# Patient Record
Sex: MALE | Race: WHITE | NOT HISPANIC OR LATINO | Employment: OTHER | URBAN - METROPOLITAN AREA
[De-identification: names, ages, dates, MRNs, and addresses within clinical notes are randomized per-mention and may not be internally consistent; named-entity substitution may affect disease eponyms.]

---

## 2021-12-15 PROBLEM — I48.91 ATRIAL FIBRILLATION WITH RAPID VENTRICULAR RESPONSE: Status: ACTIVE | Noted: 2021-12-15

## 2021-12-15 PROBLEM — R93.89 ABNORMAL CT OF THE CHEST: Status: ACTIVE | Noted: 2021-12-15

## 2021-12-15 PROBLEM — R79.89 ELEVATED TROPONIN: Status: ACTIVE | Noted: 2021-12-15

## 2021-12-15 PROBLEM — N19 RENAL FAILURE: Status: ACTIVE | Noted: 2021-12-15

## 2021-12-15 PROBLEM — Z71.89 ADVANCE CARE PLANNING: Status: ACTIVE | Noted: 2021-12-15

## 2021-12-15 PROBLEM — A41.9 SEVERE SEPSIS: Status: ACTIVE | Noted: 2021-12-15

## 2021-12-15 PROBLEM — R65.20 SEVERE SEPSIS: Status: ACTIVE | Noted: 2021-12-15

## 2021-12-15 PROBLEM — J96.01 ACUTE RESPIRATORY FAILURE WITH HYPOXIA: Status: ACTIVE | Noted: 2021-12-15

## 2021-12-15 PROBLEM — Z72.0 TOBACCO ABUSE: Status: ACTIVE | Noted: 2021-12-15

## 2021-12-15 PROBLEM — J98.8 RESPIRATORY INFECTION: Status: ACTIVE | Noted: 2021-12-15

## 2021-12-16 PROBLEM — R74.8 ELEVATED CK: Status: ACTIVE | Noted: 2021-12-16

## 2021-12-16 PROBLEM — Z91.199 MEDICAL NON-COMPLIANCE: Status: ACTIVE | Noted: 2021-12-16

## 2021-12-16 PROBLEM — E88.09 HYPOALBUMINEMIA: Status: ACTIVE | Noted: 2021-12-16

## 2021-12-18 PROBLEM — N17.9 AKI (ACUTE KIDNEY INJURY): Status: ACTIVE | Noted: 2021-12-15

## 2022-01-01 ENCOUNTER — CLINICAL SUPPORT (OUTPATIENT)
Dept: CARDIOLOGY | Facility: HOSPITAL | Age: 67
End: 2022-01-01
Attending: INTERNAL MEDICINE
Payer: MEDICARE

## 2022-01-01 ENCOUNTER — LAB VISIT (OUTPATIENT)
Dept: LAB | Facility: HOSPITAL | Age: 67
End: 2022-01-01
Attending: PHYSICIAN ASSISTANT
Payer: MEDICARE

## 2022-01-01 ENCOUNTER — PATIENT MESSAGE (OUTPATIENT)
Dept: CARDIOLOGY | Facility: CLINIC | Age: 67
End: 2022-01-01
Payer: MEDICARE

## 2022-01-01 ENCOUNTER — OFFICE VISIT (OUTPATIENT)
Dept: CARDIOLOGY | Facility: CLINIC | Age: 67
End: 2022-01-01
Payer: MEDICARE

## 2022-01-01 ENCOUNTER — HOSPITAL ENCOUNTER (OUTPATIENT)
Dept: RADIOLOGY | Facility: HOSPITAL | Age: 67
Discharge: HOME | End: 2022-11-01
Attending: NURSE PRACTITIONER
Payer: MEDICARE

## 2022-01-01 ENCOUNTER — OFFICE VISIT (OUTPATIENT)
Dept: HEPATOLOGY | Facility: CLINIC | Age: 67
End: 2022-01-01
Payer: MEDICARE

## 2022-01-01 ENCOUNTER — SPECIALTY PHARMACY (OUTPATIENT)
Dept: PHARMACY | Facility: CLINIC | Age: 67
End: 2022-01-01
Payer: MEDICAID

## 2022-01-01 ENCOUNTER — SPECIALTY PHARMACY (OUTPATIENT)
Dept: PHARMACY | Facility: CLINIC | Age: 67
End: 2022-01-01
Payer: MEDICARE

## 2022-01-01 ENCOUNTER — TELEPHONE (OUTPATIENT)
Dept: CARDIOLOGY | Facility: CLINIC | Age: 67
End: 2022-01-01
Payer: MEDICARE

## 2022-01-01 ENCOUNTER — LAB VISIT (OUTPATIENT)
Dept: LAB | Facility: HOSPITAL | Age: 67
End: 2022-01-01
Attending: NURSE PRACTITIONER
Payer: MEDICARE

## 2022-01-01 ENCOUNTER — OFFICE VISIT (OUTPATIENT)
Dept: ORTHOPEDICS | Facility: CLINIC | Age: 67
End: 2022-01-01
Payer: MEDICARE

## 2022-01-01 ENCOUNTER — TELEPHONE (OUTPATIENT)
Dept: HEPATOLOGY | Facility: CLINIC | Age: 67
End: 2022-01-01
Payer: MEDICARE

## 2022-01-01 VITALS
SYSTOLIC BLOOD PRESSURE: 111 MMHG | BODY MASS INDEX: 18.26 KG/M2 | WEIGHT: 109.56 LBS | DIASTOLIC BLOOD PRESSURE: 76 MMHG | HEART RATE: 120 BPM | HEIGHT: 65 IN | RESPIRATION RATE: 17 BRPM

## 2022-01-01 VITALS
BODY MASS INDEX: 19.98 KG/M2 | WEIGHT: 119.94 LBS | DIASTOLIC BLOOD PRESSURE: 84 MMHG | SYSTOLIC BLOOD PRESSURE: 156 MMHG | HEIGHT: 65 IN | HEART RATE: 111 BPM

## 2022-01-01 VITALS — HEIGHT: 65 IN | BODY MASS INDEX: 19.98 KG/M2 | WEIGHT: 119.94 LBS

## 2022-01-01 DIAGNOSIS — B18.2 CHRONIC HEPATITIS C WITHOUT HEPATIC COMA: ICD-10-CM

## 2022-01-01 DIAGNOSIS — R79.89 ELEVATED BRAIN NATRIURETIC PEPTIDE (BNP) LEVEL: ICD-10-CM

## 2022-01-01 DIAGNOSIS — J84.10 PULMONARY FIBROSIS: ICD-10-CM

## 2022-01-01 DIAGNOSIS — M25.512 LEFT SHOULDER PAIN: ICD-10-CM

## 2022-01-01 DIAGNOSIS — Z86.19 HEPATITIS C VIRUS INFECTION CURED AFTER ANTIVIRAL DRUG THERAPY: Primary | ICD-10-CM

## 2022-01-01 DIAGNOSIS — B18.2 CHRONIC HEPATITIS C WITHOUT HEPATIC COMA: Primary | ICD-10-CM

## 2022-01-01 DIAGNOSIS — M25.511 BILATERAL SHOULDER PAIN, UNSPECIFIED CHRONICITY: ICD-10-CM

## 2022-01-01 DIAGNOSIS — M25.511 BILATERAL SHOULDER PAIN, UNSPECIFIED CHRONICITY: Primary | ICD-10-CM

## 2022-01-01 DIAGNOSIS — M25.512 BILATERAL SHOULDER PAIN, UNSPECIFIED CHRONICITY: Primary | ICD-10-CM

## 2022-01-01 DIAGNOSIS — I48.91 ATRIAL FIBRILLATION WITH RAPID VENTRICULAR RESPONSE: ICD-10-CM

## 2022-01-01 DIAGNOSIS — R74.8 ELEVATED ALKALINE PHOSPHATASE LEVEL: ICD-10-CM

## 2022-01-01 DIAGNOSIS — R93.89 ABNORMAL CT OF THE CHEST: ICD-10-CM

## 2022-01-01 DIAGNOSIS — R79.89 ELEVATED TROPONIN: ICD-10-CM

## 2022-01-01 DIAGNOSIS — M25.512 BILATERAL SHOULDER PAIN, UNSPECIFIED CHRONICITY: ICD-10-CM

## 2022-01-01 DIAGNOSIS — Z86.19 HEPATITIS C VIRUS INFECTION CURED AFTER ANTIVIRAL DRUG THERAPY: ICD-10-CM

## 2022-01-01 DIAGNOSIS — I48.91 ATRIAL FIBRILLATION WITH RAPID VENTRICULAR RESPONSE: Primary | ICD-10-CM

## 2022-01-01 DIAGNOSIS — M75.42 IMPINGEMENT SYNDROME OF LEFT SHOULDER: Primary | ICD-10-CM

## 2022-01-01 DIAGNOSIS — R74.8 ELEVATED ALKALINE PHOSPHATASE LEVEL: Primary | ICD-10-CM

## 2022-01-01 LAB
ACE SERPL-CCNC: 19 U/L (ref 16–85)
ALBUMIN SERPL BCP-MCNC: 2.8 G/DL (ref 3.5–5.2)
ALBUMIN SERPL BCP-MCNC: 2.9 G/DL (ref 3.5–5.2)
ALP BONE CFR SERPL: 43 % (ref 28–66)
ALP INTEST CFR SERPL: 0 % (ref 1–24)
ALP ISOS SERPL-IMP: ABNORMAL
ALP LIVER CFR SERPL: 57 % (ref 25–69)
ALP MACROHEPATIC CFR SERPL: ABNORMAL %
ALP PLAC CFR SERPL: 0 %
ALP SERPL-CCNC: 224 U/L (ref 35–144)
ALP SERPL-CCNC: 247 U/L (ref 55–135)
ALP SERPL-CCNC: 330 U/L (ref 55–135)
ALT SERPL W/O P-5'-P-CCNC: 16 U/L (ref 10–44)
ALT SERPL W/O P-5'-P-CCNC: 22 U/L (ref 10–44)
AST SERPL-CCNC: 26 U/L (ref 10–40)
AST SERPL-CCNC: 30 U/L (ref 10–40)
BILIRUB DIRECT SERPL-MCNC: 0.1 MG/DL (ref 0.1–0.3)
BILIRUB DIRECT SERPL-MCNC: 0.2 MG/DL (ref 0.1–0.3)
BILIRUB SERPL-MCNC: 0.3 MG/DL (ref 0.1–1)
BILIRUB SERPL-MCNC: 0.5 MG/DL (ref 0.1–1)
FERRITIN SERPL-MCNC: 599 NG/ML (ref 20–300)
GGT SERPL-CCNC: 264 U/L (ref 8–55)
HCV RNA SERPL QL NAA+PROBE: NOT DETECTED
HCV RNA SPEC NAA+PROBE-ACNC: <12 IU/ML
IGM SERPL-MCNC: 147 MG/DL (ref 50–300)
IRON SERPL-MCNC: 29 UG/DL (ref 45–160)
MITOCHONDRIA AB TITR SER IF: NORMAL {TITER}
OHS CV EVENT MONITOR DAY: 0
OHS CV HOLTER LENGTH DECIMAL HOURS: 48
OHS CV HOLTER LENGTH HOURS: 48
OHS CV HOLTER LENGTH MINUTES: 0
OHS CV HOLTER SINUS AVERAGE HR: 81
OHS CV HOLTER SINUS MAX HR: 145
OHS CV HOLTER SINUS MIN HR: 50
PROT SERPL-MCNC: 7.8 G/DL (ref 6–8.4)
PROT SERPL-MCNC: 7.8 G/DL (ref 6–8.4)
SATURATED IRON: 13 % (ref 20–50)
TOTAL IRON BINDING CAPACITY: 228 UG/DL (ref 250–450)
TRANSFERRIN SERPL-MCNC: 154 MG/DL (ref 200–375)

## 2022-01-01 PROCEDURE — 93227 XTRNL ECG REC<48 HR R&I: CPT | Mod: ,,, | Performed by: INTERNAL MEDICINE

## 2022-01-01 PROCEDURE — 99214 PR OFFICE/OUTPT VISIT, EST, LEVL IV, 30-39 MIN: ICD-10-PCS | Mod: S$PBB,,, | Performed by: INTERNAL MEDICINE

## 2022-01-01 PROCEDURE — 73030 X-RAY EXAM OF SHOULDER: CPT | Mod: 26,LT,, | Performed by: RADIOLOGY

## 2022-01-01 PROCEDURE — 99203 OFFICE O/P NEW LOW 30 MIN: CPT | Mod: S$PBB,25,, | Performed by: NURSE PRACTITIONER

## 2022-01-01 PROCEDURE — 82164 ANGIOTENSIN I ENZYME TEST: CPT | Performed by: NURSE PRACTITIONER

## 2022-01-01 PROCEDURE — 20610 DRAIN/INJ JOINT/BURSA W/O US: CPT | Mod: S$PBB,LT,, | Performed by: NURSE PRACTITIONER

## 2022-01-01 PROCEDURE — 93226 XTRNL ECG REC<48 HR SCAN A/R: CPT | Mod: PO

## 2022-01-01 PROCEDURE — 36415 COLL VENOUS BLD VENIPUNCTURE: CPT | Mod: PO | Performed by: NURSE PRACTITIONER

## 2022-01-01 PROCEDURE — 99999 PR PBB SHADOW E&M-EST. PATIENT-LVL IV: CPT | Mod: PBBFAC,,, | Performed by: NURSE PRACTITIONER

## 2022-01-01 PROCEDURE — 99999 PR PBB SHADOW E&M-EST. PATIENT-LVL III: CPT | Mod: PBBFAC,,, | Performed by: NURSE PRACTITIONER

## 2022-01-01 PROCEDURE — 20610 DRAIN/INJ JOINT/BURSA W/O US: CPT | Mod: PBBFAC,PN | Performed by: NURSE PRACTITIONER

## 2022-01-01 PROCEDURE — 99214 OFFICE O/P EST MOD 30 MIN: CPT | Mod: PBBFAC,PO | Performed by: NURSE PRACTITIONER

## 2022-01-01 PROCEDURE — 80076 HEPATIC FUNCTION PANEL: CPT | Performed by: PHYSICIAN ASSISTANT

## 2022-01-01 PROCEDURE — 36415 COLL VENOUS BLD VENIPUNCTURE: CPT | Mod: PN | Performed by: PHYSICIAN ASSISTANT

## 2022-01-01 PROCEDURE — 99999 PR PBB SHADOW E&M-EST. PATIENT-LVL III: ICD-10-PCS | Mod: PBBFAC,,, | Performed by: NURSE PRACTITIONER

## 2022-01-01 PROCEDURE — 99214 PR OFFICE/OUTPT VISIT, EST, LEVL IV, 30-39 MIN: ICD-10-PCS | Mod: S$PBB,,, | Performed by: NURSE PRACTITIONER

## 2022-01-01 PROCEDURE — 80076 HEPATIC FUNCTION PANEL: CPT | Performed by: NURSE PRACTITIONER

## 2022-01-01 PROCEDURE — 99214 OFFICE O/P EST MOD 30 MIN: CPT | Mod: S$PBB,,, | Performed by: INTERNAL MEDICINE

## 2022-01-01 PROCEDURE — 93227 HOLTER MONITOR - 48 HOUR (CUPID ONLY): ICD-10-PCS | Mod: ,,, | Performed by: INTERNAL MEDICINE

## 2022-01-01 PROCEDURE — 99213 OFFICE O/P EST LOW 20 MIN: CPT | Mod: PBBFAC,PN | Performed by: NURSE PRACTITIONER

## 2022-01-01 PROCEDURE — 99203 PR OFFICE/OUTPT VISIT, NEW, LEVL III, 30-44 MIN: ICD-10-PCS | Mod: S$PBB,25,, | Performed by: NURSE PRACTITIONER

## 2022-01-01 PROCEDURE — 84466 ASSAY OF TRANSFERRIN: CPT | Performed by: NURSE PRACTITIONER

## 2022-01-01 PROCEDURE — 99999 PR PBB SHADOW E&M-EST. PATIENT-LVL IV: CPT | Mod: PBBFAC,,, | Performed by: INTERNAL MEDICINE

## 2022-01-01 PROCEDURE — 86381 MITOCHONDRIAL ANTIBODY EACH: CPT | Performed by: NURSE PRACTITIONER

## 2022-01-01 PROCEDURE — 99214 OFFICE O/P EST MOD 30 MIN: CPT | Mod: S$PBB,,, | Performed by: NURSE PRACTITIONER

## 2022-01-01 PROCEDURE — 82784 ASSAY IGA/IGD/IGG/IGM EACH: CPT | Performed by: NURSE PRACTITIONER

## 2022-01-01 PROCEDURE — 82977 ASSAY OF GGT: CPT | Performed by: NURSE PRACTITIONER

## 2022-01-01 PROCEDURE — 84075 ASSAY ALKALINE PHOSPHATASE: CPT | Mod: 91 | Performed by: NURSE PRACTITIONER

## 2022-01-01 PROCEDURE — 87522 HEPATITIS C REVRS TRNSCRPJ: CPT | Performed by: PHYSICIAN ASSISTANT

## 2022-01-01 PROCEDURE — 82728 ASSAY OF FERRITIN: CPT | Performed by: NURSE PRACTITIONER

## 2022-01-01 PROCEDURE — 99999 PR PBB SHADOW E&M-EST. PATIENT-LVL IV: ICD-10-PCS | Mod: PBBFAC,,, | Performed by: INTERNAL MEDICINE

## 2022-01-01 PROCEDURE — 20610 LARGE JOINT ASPIRATION/INJECTION: L SUBACROMIAL BURSA: ICD-10-PCS | Mod: S$PBB,LT,, | Performed by: NURSE PRACTITIONER

## 2022-01-01 PROCEDURE — 73030 X-RAY EXAM OF SHOULDER: CPT | Mod: TC,PO,LT

## 2022-01-01 PROCEDURE — 99999 PR PBB SHADOW E&M-EST. PATIENT-LVL IV: ICD-10-PCS | Mod: PBBFAC,,, | Performed by: NURSE PRACTITIONER

## 2022-01-01 PROCEDURE — 73030 XR SHOULDER TRAUMA 3 VIEW LEFT: ICD-10-PCS | Mod: 26,LT,, | Performed by: RADIOLOGY

## 2022-01-01 PROCEDURE — 99214 OFFICE O/P EST MOD 30 MIN: CPT | Mod: PBBFAC,PO | Performed by: INTERNAL MEDICINE

## 2022-01-01 RX ORDER — KETOCONAZOLE 20 MG/ML
SHAMPOO, SUSPENSION TOPICAL
COMMUNITY
Start: 2022-01-01

## 2022-01-01 RX ORDER — FINASTERIDE 1 MG/1
TABLET, FILM COATED ORAL
COMMUNITY
End: 2022-01-01

## 2022-01-01 RX ORDER — MEGESTROL ACETATE 125 MG/ML
5 SUSPENSION ORAL
Status: ON HOLD | COMMUNITY
End: 2023-01-01 | Stop reason: CLARIF

## 2022-01-01 RX ORDER — MOXIFLOXACIN 5 MG/ML
SOLUTION/ DROPS OPHTHALMIC
COMMUNITY
Start: 2022-05-12 | End: 2022-01-01

## 2022-01-01 RX ORDER — CLOTRIMAZOLE AND BETAMETHASONE DIPROPIONATE 10; .64 MG/G; MG/G
CREAM TOPICAL
COMMUNITY
Start: 2022-04-11

## 2022-01-01 RX ORDER — SERTRALINE HYDROCHLORIDE 25 MG/1
25 TABLET, FILM COATED ORAL DAILY
Status: ON HOLD | COMMUNITY
Start: 2022-01-01 | End: 2023-01-01 | Stop reason: CLARIF

## 2022-01-01 RX ORDER — AMMONIUM LACTATE 12 G/100G
1 CREAM TOPICAL 3 TIMES DAILY
COMMUNITY
Start: 2022-01-01 | End: 2023-01-01

## 2022-01-01 RX ORDER — PREDNISOLONE ACETATE 10 MG/ML
1 SUSPENSION/ DROPS OPHTHALMIC 4 TIMES DAILY
COMMUNITY
Start: 2022-06-02 | End: 2022-01-01

## 2022-01-01 RX ORDER — LEVOFLOXACIN 500 MG/1
TABLET, FILM COATED ORAL
Status: ON HOLD | COMMUNITY
End: 2023-01-01 | Stop reason: CLARIF

## 2022-01-01 RX ORDER — TRIAMCINOLONE ACETONIDE 40 MG/ML
40 INJECTION, SUSPENSION INTRA-ARTICULAR; INTRAMUSCULAR
Status: DISCONTINUED | OUTPATIENT
Start: 2022-01-01 | End: 2022-01-01 | Stop reason: HOSPADM

## 2022-01-01 RX ORDER — CLOBETASOL PROPIONATE 0.46 MG/ML
SOLUTION TOPICAL
COMMUNITY
Start: 2022-04-11

## 2022-01-01 RX ORDER — TRIAMCINOLONE ACETONIDE 1 MG/G
1 OINTMENT TOPICAL 2 TIMES DAILY
COMMUNITY
Start: 2022-04-11

## 2022-01-01 RX ORDER — TIOTROPIUM BROMIDE AND OLODATEROL 3.124; 2.736 UG/1; UG/1
1 SPRAY, METERED RESPIRATORY (INHALATION) DAILY
COMMUNITY
Start: 2022-05-09 | End: 2022-01-01

## 2022-01-01 RX ORDER — CLOBETASOL PROPIONATE 0.46 MG/ML
SOLUTION TOPICAL
COMMUNITY

## 2022-01-01 RX ORDER — BROMFENAC SODIUM 0.7 MG/ML
1 SOLUTION/ DROPS OPHTHALMIC DAILY
COMMUNITY
Start: 2022-06-03 | End: 2022-01-01

## 2022-01-01 RX ADMIN — TRIAMCINOLONE ACETONIDE 40 MG: 40 INJECTION, SUSPENSION INTRA-ARTICULAR; INTRAMUSCULAR at 03:11

## 2022-02-07 ENCOUNTER — OFFICE VISIT (OUTPATIENT)
Dept: CARDIOLOGY | Facility: CLINIC | Age: 67
End: 2022-02-07
Payer: MEDICARE

## 2022-02-07 VITALS
BODY MASS INDEX: 18.2 KG/M2 | DIASTOLIC BLOOD PRESSURE: 93 MMHG | WEIGHT: 109.38 LBS | HEART RATE: 94 BPM | SYSTOLIC BLOOD PRESSURE: 138 MMHG

## 2022-02-07 DIAGNOSIS — J84.10 PULMONARY FIBROSIS: Primary | ICD-10-CM

## 2022-02-07 DIAGNOSIS — R79.89 ELEVATED TROPONIN: ICD-10-CM

## 2022-02-07 DIAGNOSIS — R06.02 SHORTNESS OF BREATH: ICD-10-CM

## 2022-02-07 DIAGNOSIS — N17.9 AKI (ACUTE KIDNEY INJURY): ICD-10-CM

## 2022-02-07 DIAGNOSIS — I48.91 ATRIAL FIBRILLATION WITH RAPID VENTRICULAR RESPONSE: ICD-10-CM

## 2022-02-07 DIAGNOSIS — R65.20 SEVERE SEPSIS: ICD-10-CM

## 2022-02-07 DIAGNOSIS — Z71.89 ADVANCE CARE PLANNING: ICD-10-CM

## 2022-02-07 DIAGNOSIS — R06.03 RESPIRATORY DISTRESS: ICD-10-CM

## 2022-02-07 DIAGNOSIS — A41.9 SEVERE SEPSIS: ICD-10-CM

## 2022-02-07 PROCEDURE — 99214 PR OFFICE/OUTPT VISIT, EST, LEVL IV, 30-39 MIN: ICD-10-PCS | Mod: S$PBB,,, | Performed by: INTERNAL MEDICINE

## 2022-02-07 PROCEDURE — 99999 PR PBB SHADOW E&M-EST. PATIENT-LVL III: CPT | Mod: PBBFAC,,, | Performed by: INTERNAL MEDICINE

## 2022-02-07 PROCEDURE — 99999 PR PBB SHADOW E&M-EST. PATIENT-LVL III: ICD-10-PCS | Mod: PBBFAC,,, | Performed by: INTERNAL MEDICINE

## 2022-02-07 PROCEDURE — 99213 OFFICE O/P EST LOW 20 MIN: CPT | Mod: PBBFAC,PO | Performed by: INTERNAL MEDICINE

## 2022-02-07 PROCEDURE — 99214 OFFICE O/P EST MOD 30 MIN: CPT | Mod: S$PBB,,, | Performed by: INTERNAL MEDICINE

## 2022-02-07 NOTE — PROGRESS NOTES
Subjective:    Patient ID:  Magdy Austin is a 66 y.o. male patient here for evaluation Establish Care, Shortness of Breath, and Atrial Fibrillation      History of Present Illness:  Hospital follow-up.  History of pulmonary fibrosis, hospitalization for syncope and probable septic syndrome with underlying pneumonia.  Patient recovered, back baseline.  Other problems encountered during the hospitalization was paroxysmal atrial fibrillation which apparently responded well to calcium channel blocker therapy.  Patient is now on apixaban 5 p.o. b.i.d..    Patient's quit tobacco products since hospitalization.       Workup during the hospitalization included nuclear stress test, echo.  Ejection fraction in the 45%, nuclear study negative for ischemia.  Significant valvular heart issues.            Review of patient's allergies indicates:  No Known Allergies    Past Medical History:   Diagnosis Date    Dysrhythmia     Medical non-compliance      No past surgical history on file.  Social History     Tobacco Use    Smoking status: Former Smoker     Packs/day: 1.50     Years: 30.00     Pack years: 45.00     Types: Cigarettes     Quit date: 2021     Years since quittin.1    Smokeless tobacco: Never Used    Tobacco comment: used to smoke 2 ppd now down to 1 ppd   Substance Use Topics    Alcohol use: Yes     Comment: once a week    Drug use: Never        Review of Systems:    As noted in HPI in addition      REVIEW OF SYSTEMS  Review of Systems   Constitutional: Negative for decreased appetite, diaphoresis, night sweats, weight gain and weight loss.   HENT: Negative for nosebleeds and odynophagia.    Eyes: Negative for double vision and photophobia.   Cardiovascular: Negative for chest pain, claudication, cyanosis, dyspnea on exertion, irregular heartbeat, leg swelling, near-syncope, orthopnea, palpitations, paroxysmal nocturnal dyspnea and syncope.   Respiratory: Negative for cough, hemoptysis,  shortness of breath and wheezing.    Hematologic/Lymphatic: Negative for adenopathy.   Skin: Negative for flushing, skin cancer and suspicious lesions.   Musculoskeletal: Negative for gout, myalgias and neck pain.   Gastrointestinal: Negative for abdominal pain, heartburn, hematemesis and hematochezia.   Genitourinary: Negative for bladder incontinence, hesitancy and nocturia.   Neurological: Negative for focal weakness, headaches, light-headedness and paresthesias.   Psychiatric/Behavioral: Negative for memory loss and substance abuse.              Objective:        Vitals:    02/07/22 0937   BP: (!) 138/93   Pulse: 94       Lab Results   Component Value Date    WBC 13.55 (H) 12/21/2021    HGB 12.2 (L) 12/21/2021    HCT 37.9 (L) 12/21/2021     12/21/2021    CHOL 71 (L) 12/16/2021    TRIG 115 12/16/2021    HDL 14 (L) 12/16/2021    ALT 50 12/20/2021    AST 71 (H) 12/20/2021     12/20/2021    K 4.9 12/20/2021     12/20/2021    CREATININE 0.66 12/20/2021    BUN 24 (H) 12/20/2021    CO2 34 (H) 12/20/2021    TSH 0.654 12/16/2021    INR 1.2 12/15/2021    HGBA1C 5.5 12/16/2021        ECHOCARDIOGRAM RESULTS  Results for orders placed during the hospital encounter of 12/15/21    Echo    Interpretation Summary  · Mild left atrial enlargement.  · The left ventricle is normal in size with mildly decreased systolic function.  · Grade I left ventricular diastolic dysfunction.  · The estimated PA systolic pressure is 14 mmHg.  · Normal central venous pressure (3 mmHg).  · The estimated ejection fraction is 45%.  · There is abnormal septal wall motion.  · Mild right atrial enlargement.  · Mild mitral regurgitation.  · Mild tricuspid regurgitation.        CURRENT/PREVIOUS VISIT EKG  Results for orders placed or performed during the hospital encounter of 12/15/21   EKG 12-lead    Collection Time: 12/15/21  8:35 PM    Narrative    Test Reason : I48.91,    Vent. Rate : 094 BPM     Atrial Rate : 094 BPM     P-R Int :  136 ms          QRS Dur : 084 ms      QT Int : 386 ms       P-R-T Axes : 071 -09 044 degrees     QTc Int : 482 ms    Normal sinus rhythm with sinus arrhythmia  Anterior infarct (cited on or before 15-DEC-2021)  Abnormal ECG  When compared with ECG of 15-DEC-2021 17:29,  Sinus rhythm has replaced Atrial fibrillation  Vent. rate has decreased BY  91 BPM  Questionable change in initial forces of Septal leads  Confirmed by Jaren Finn MD ([id number]) on 12/16/2021 9:55:38 AM    Referred By: CITLALY   SELF           Confirmed By:Jaren Finn MD     No valid procedures specified.   Results for orders placed during the hospital encounter of 12/15/21    Nuclear Stress - Cardiology Interpreted    Interpretation Summary    Normal myocardial perfusion scan. There is no evidence of myocardial ischemia or infarction.    There is trivial apical thinning which is a normal variant.    The gated perfusion images showed an ejection fraction of 44% at rest.    The EKG portion of this study is negative for ischemia.    The patient reported no chest pain during the stress test.    During stress, occasional PVCs are noted.    No valid procedures specified.    PHYSICAL EXAM  CONSTITUTIONAL: Well built, well nourished in no apparent distress  NECK: no carotid bruit, no JVD  LUNGS: CTA  CHEST WALL: no tenderness,  HEART: regular rate and rhythm, S1, S2 normal, no murmur, click, rub or gallop   ABDOMEN: soft, non-tender; bowel sounds normal; no masses,  no organomegaly  EXTREMITIES: Extremities normal, no edema, no calf tenderness noted  NEURO: AAO X 3    I HAVE REVIEWED :    The vital signs, nurses notes, and all the pertinent radiology and labs.         Current Outpatient Medications   Medication Instructions    albuterol (PROVENTIL) 2.5 mg, Nebulization, Every 6 hours PRN, Rescue    albuterol (PROVENTIL/VENTOLIN HFA) 90 mcg/actuation inhaler 2 puffs, Inhalation, Every 6 hours PRN, Rescue    apixaban (ELIQUIS) 5 mg, Oral, 2 times  daily    aspirin (ECOTRIN) 81 mg, Oral, Daily    diltiaZEM (CARDIZEM CD) 120 mg, Oral, Daily    fluticasone propionate (FLOVENT HFA) 44 mcg/actuation inhaler 1 puff, Inhalation, 2 times daily, Controller    hydrALAZINE (APRESOLINE) 25 mg, Oral, 3 times daily    levalbuterol (XOPENEX) 0.63 mg, Nebulization, Every 6 hours PRN, Rescue    nicotine (NICODERM CQ) 21 mg/24 hr 1 patch, Transdermal, Daily    ondansetron (ZOFRAN-ODT) 4 mg, Oral, Every 8 hours PRN    predniSONE (DELTASONE) 20 mg, Oral, Daily    tiotropium-olodateroL (STIOLTO RESPIMAT) 2.5-2.5 mcg/actuation Mist 1 puff, Inhalation, Daily, Controller    TRELEGY ELLIPTA 100-62.5-25 mcg DsDv 1 puff, Oral, Daily          Assessment:   Hospital follow-up.  Pulmonary fibrosis pneumonia sepsis.  Paroxysmal atrial fibrillation  History of tobacco use, chronic lung issue, has since stopped tobacco products.  Echo EF 45% with no significant valvular heart issues, nuclear study negative for ischemia.        Plan:   Continue Eliquis, diltiazem.  Cardiac hemodynamics otherwise stable.    Follow-up 3 months.          No follow-ups on file.

## 2022-02-22 ENCOUNTER — TELEPHONE (OUTPATIENT)
Dept: HEPATOLOGY | Facility: CLINIC | Age: 67
End: 2022-02-22
Payer: MEDICAID

## 2022-02-22 NOTE — TELEPHONE ENCOUNTER
Alvina Ortiz DNP ordered that patient be scheduled for a hep c consult visit.  Clinicals given to PA Scheuermann for review.   I spoke with patient and his sister.  Patient quant positive.  Virtual visit with PA Scheuermann scheduled 2/28/22; appt reminder notice mailed.

## 2022-02-28 ENCOUNTER — TELEPHONE (OUTPATIENT)
Dept: HEPATOLOGY | Facility: CLINIC | Age: 67
End: 2022-02-28
Payer: MEDICAID

## 2022-02-28 ENCOUNTER — OFFICE VISIT (OUTPATIENT)
Dept: HEPATOLOGY | Facility: CLINIC | Age: 67
End: 2022-02-28
Payer: MEDICARE

## 2022-02-28 DIAGNOSIS — B18.2 CHRONIC HEPATITIS C WITHOUT HEPATIC COMA: Primary | ICD-10-CM

## 2022-02-28 PROCEDURE — 99203 OFFICE O/P NEW LOW 30 MIN: CPT | Mod: 95,,, | Performed by: PHYSICIAN ASSISTANT

## 2022-02-28 PROCEDURE — 99203 PR OFFICE/OUTPT VISIT, NEW, LEVL III, 30-44 MIN: ICD-10-PCS | Mod: 95,,, | Performed by: PHYSICIAN ASSISTANT

## 2022-02-28 NOTE — PROGRESS NOTES
HEPATOLOGY VIDEO VISIT NOTE - HCV clinic  The patient location is: home  Visit type: audiovisual    Each patient to whom he or she provides medical services by telemedicine is:  (1) informed of the relationship between the physician and patient and the respective role of any other health care provider with respect to management of the patient; and (2) notified that he or she may decline to receive medical services by telemedicine and may withdraw from such care at any time.    OUTSIDE REFERRING PROVIDER: Alvina Ortiz NP  CHIEF COMPLAINT: Hepatitis C     HISTORY     This is a 66 y.o. White male with chronic hepatitis C, here for further eval / mngmt.   Outside records have been received / reviewed.    Pertinent PMH of paroxysmal AFib during hospitalization 12/2021 for sepsis syndrome, resp failure due to pneumonia, cardiology note reviewed. On CCB + Eliquis. No amiodarone. Has had PT/OT in house since hospitalization but they were recently stopped, doing better. Still w/ dyspnea w/ exertion    HCV history:  Recently diagnosed  Prior icteric illnesses: None  Risks for HCV:  Blood transfusion - no    IVDA / Intranasal drug use - no    Tattoos - first one early 20s  - Treatment naive  - Genotype ?  - HCV RNA 3.2 mill IU/mL - 2/2022 (media)    Liver staging:  No formal liver staging    Labs and imaging reveal no obvious evidence of advanced fibrosis   U/S 2/2022 - liver unremarkable. Spleen not assessed. No ascites   Labs - well preserved liver function. Mild AST elevation. Plt > 200    Denies jaundice, dark urine, abdominal distention, hematemesis, melena, slowed mentation.   No abnormal skin rashes but skin is dry and scaly. No generalized joint pain.        PMH, PSH, PROBLEM LIST, SOCIAL HX, FAMILY HX, MEDS, ALLERGIES   Reviewed in EPIC  Pertinent findings: paroxysmal AFib, valvular heart disease, pulmonary fibrosis  FAMILY HISTORY: neg for liver disease  SOCIAL HISTORY:   Alcohol - 2-3 margaritas per week but  no hx of heavy alcohol use. Does not keep alcohol in house.  Drugs - denies      ROS:   Review of Systems   Respiratory: Positive for shortness of breath (w/ exertion).    Cardiovascular: Negative for chest pain and leg swelling.   Gastrointestinal: Negative for abdominal pain.   Skin: Positive for rash (rough, scaley skin). Negative for itching.       PHYSICAL EXAM:  Friendly White male, in no acute distress; alert and oriented to person, place and time. O2 by NC in place  LUNGS: Normal respiratory effort.  NEURO/PSYCH: Memory intact. Thought and speech pattern appropriate. Behavior normal. No depression or anxiety noted.    PERTINENT DIAGNOSTIC RESULTS     Lab Results   Component Value Date    WBC 13.55 (H) 12/21/2021    HGB 12.2 (L) 12/21/2021     12/21/2021     Lab Results   Component Value Date    INR 1.2 12/15/2021     Lab Results   Component Value Date    AST 71 (H) 12/20/2021    ALT 50 12/20/2021    BILITOT 0.2 12/20/2021    ALBUMIN 2.8 (L) 12/20/2021    ALKPHOS 189 (H) 12/20/2021    CREATININE 0.66 12/20/2021    BUN 24 (H) 12/20/2021     12/20/2021    K 4.9 12/20/2021       Outside labs: 2/3/22  WBC 10.3, HGB 13.3,   BUN 11, CREATININE 0.49, , K 4.5, ALBUMIN 3.3, ALKPHOS 189, TOTBILI 0.3, AST 55, ALT 36  HIV - neg      US Abdomen Limited - 2/21/22  Narrative  EXAMINATIONS:  1.  US ABDOMEN LIMITED 02/21/2022 at 07:06  2.  Aortic abdominal ultrasound 02/21/2022 at 07:06    INDICATION:  Hepatitis C clinical history is provided. No history of recent trauma or surgery is provided.    COMPARISON  No recent abdomen ultrasound or CT is currently available.  Renal ultrasound report of 12/16/2021.    FINDINGS  Right upper quadrant ultrasound: No free fluid collections are appreciated. The liver measures 15.3 cm and demonstrates within normal echogenicity. No gross contour deformity is appreciated.  Portal venous flow is within normal.  The common bile duct measures 4.2 mm.  The pancreas is  largely obscured. There is bowel gas, penetration artifact present. No echogenic obstructive calculus, wall thickening or pericholecystic fluid is appreciated. No sonographic Coronel sign is provided. The right kidney measures 11.7 x 4.6 x 4.6 cm. No echogenic obstructive calculus or hydronephrosis is appreciated.    Aortic ultrasound: The proximal aorta measures 1.7 x 2.3 cm, mid 1.5 x 1.6 cm and distal 1.4 x 1.5 cm with unremarkable adjacent IVC color Doppler flow demonstrated.  The iliac vessels measure approximately 0.8 cm.  No periaortic fluid collections are currently appreciated.    IMPRESSION  1.  No abdominal aortic aneurysmal dilatation or periaortic fluid collections are appreciated.  2.  No free fluid collection is appreciated.  3.  No echogenic obstructive calculus, biliary dilatation or hydronephrosis is appreciated.      ASSESSMENT     66 y.o. White male with:  1. CHRONIC HEPATITIS C, GENOTYPE ? - treatment naive  -- Elevated transaminases  -- Unknown Immunity to HAV & HBV         PLAN     1. Schedule FibroScan, labs, visit - all on same day  2.   Goal of antiviral therapy  Orders Placed This Encounter   Procedures    FibroScan (Vibration Controlled Transient Elastography)    HEPATOLOGY LABS (EXTERNAL)    CBC Auto Differential    Comprehensive Metabolic Panel    Protime-INR    Hepatitis C Genotype    Hepatitis B Surface Antigen    Hepatitis B Surface Ab, Qualitative    Hepatitis B Core Antibody, Total    Hepatitis A antibody, IgG       ____________________________________________________________________________  EDUCATION:  The natural history of Hepatitis C, including potential progression to cirrhosis was reviewed. We discussed the increased progression of liver disease secondary to alcohol use; patient was advised to avoid alcohol completely.     Transmission of Hepatitis C was reviewed, including possible sexual transmission. Sexual contacts should be screened.   Patient should avoid  sharing personal products such as razors, toothbrushes, etc.     Recommend avoiding raw seafood.  Limit acetaminophen to 2000mg daily.    ____________________________________________________________________________  Duration of encounter: 40 min  This includes face-to-face time and non face-to-face time preparing to see the patient (eg, review of tests), obtaining and/or reviewing separately obtained history, documenting clinical information in the electronic or other health record, independently interpreting resultsand communicating results to the patient/family/caregiver, or care coordination.

## 2022-02-28 NOTE — TELEPHONE ENCOUNTER
----- Message from Jennifer B. Scheuermann, PA sent at 2/28/2022  9:43 AM CST -----  Pls schedule labs, fibroscan, visit - all on same day at main campus. Pls have him do labs AFTER visit in case I need to add on extra stuff after fibroscan results

## 2022-02-28 NOTE — Clinical Note
Pls schedule labs, fibroscan, visit - all on same day at main campus. Pls have him do labs AFTER visit in case I need to add on extra stuff after fibroscan results

## 2022-03-25 ENCOUNTER — PROCEDURE VISIT (OUTPATIENT)
Dept: HEPATOLOGY | Facility: CLINIC | Age: 67
End: 2022-03-25
Payer: MEDICARE

## 2022-03-25 ENCOUNTER — TELEPHONE (OUTPATIENT)
Dept: HEPATOLOGY | Facility: CLINIC | Age: 67
End: 2022-03-25
Payer: MEDICAID

## 2022-03-25 ENCOUNTER — LAB VISIT (OUTPATIENT)
Dept: LAB | Facility: HOSPITAL | Age: 67
End: 2022-03-25
Payer: MEDICARE

## 2022-03-25 ENCOUNTER — OFFICE VISIT (OUTPATIENT)
Dept: HEPATOLOGY | Facility: CLINIC | Age: 67
End: 2022-03-25
Payer: MEDICARE

## 2022-03-25 VITALS
TEMPERATURE: 98 F | SYSTOLIC BLOOD PRESSURE: 148 MMHG | WEIGHT: 120 LBS | OXYGEN SATURATION: 97 % | HEART RATE: 80 BPM | DIASTOLIC BLOOD PRESSURE: 83 MMHG | BODY MASS INDEX: 19.97 KG/M2

## 2022-03-25 DIAGNOSIS — B18.2 CHRONIC HEPATITIS C WITHOUT HEPATIC COMA: ICD-10-CM

## 2022-03-25 DIAGNOSIS — B18.2 CHRONIC HEPATITIS C WITHOUT HEPATIC COMA: Primary | ICD-10-CM

## 2022-03-25 DIAGNOSIS — E87.5 HYPERKALEMIA: Primary | ICD-10-CM

## 2022-03-25 LAB
ALBUMIN SERPL BCP-MCNC: 3 G/DL (ref 3.5–5.2)
ALP SERPL-CCNC: 135 U/L (ref 55–135)
ALT SERPL W/O P-5'-P-CCNC: 20 U/L (ref 10–44)
ANION GAP SERPL CALC-SCNC: 9 MMOL/L (ref 8–16)
AST SERPL-CCNC: 26 U/L (ref 10–40)
BASOPHILS # BLD AUTO: 0.06 K/UL (ref 0–0.2)
BASOPHILS NFR BLD: 0.5 % (ref 0–1.9)
BILIRUB SERPL-MCNC: 0.3 MG/DL (ref 0.1–1)
BUN SERPL-MCNC: 15 MG/DL (ref 8–23)
CALCIUM SERPL-MCNC: 10.7 MG/DL (ref 8.7–10.5)
CHLORIDE SERPL-SCNC: 103 MMOL/L (ref 95–110)
CO2 SERPL-SCNC: 26 MMOL/L (ref 23–29)
CREAT SERPL-MCNC: 0.6 MG/DL (ref 0.5–1.4)
DIFFERENTIAL METHOD: ABNORMAL
EOSINOPHIL # BLD AUTO: 0.1 K/UL (ref 0–0.5)
EOSINOPHIL NFR BLD: 1.2 % (ref 0–8)
ERYTHROCYTE [DISTWIDTH] IN BLOOD BY AUTOMATED COUNT: 14.6 % (ref 11.5–14.5)
EST. GFR  (AFRICAN AMERICAN): >60 ML/MIN/1.73 M^2
EST. GFR  (NON AFRICAN AMERICAN): >60 ML/MIN/1.73 M^2
GLUCOSE SERPL-MCNC: 95 MG/DL (ref 70–110)
HAV IGG SER QL IA: POSITIVE
HBV CORE AB SERPL QL IA: NEGATIVE
HBV SURFACE AB SER-ACNC: NEGATIVE M[IU]/ML
HBV SURFACE AG SERPL QL IA: NEGATIVE
HCT VFR BLD AUTO: 42.1 % (ref 40–54)
HGB BLD-MCNC: 12.6 G/DL (ref 14–18)
IMM GRANULOCYTES # BLD AUTO: 0.05 K/UL (ref 0–0.04)
IMM GRANULOCYTES NFR BLD AUTO: 0.4 % (ref 0–0.5)
INR PPP: 1.1 (ref 0.8–1.2)
LYMPHOCYTES # BLD AUTO: 1.2 K/UL (ref 1–4.8)
LYMPHOCYTES NFR BLD: 10.2 % (ref 18–48)
MCH RBC QN AUTO: 29.9 PG (ref 27–31)
MCHC RBC AUTO-ENTMCNC: 29.9 G/DL (ref 32–36)
MCV RBC AUTO: 100 FL (ref 82–98)
MONOCYTES # BLD AUTO: 0.7 K/UL (ref 0.3–1)
MONOCYTES NFR BLD: 5.7 % (ref 4–15)
NEUTROPHILS # BLD AUTO: 9.4 K/UL (ref 1.8–7.7)
NEUTROPHILS NFR BLD: 82 % (ref 38–73)
NRBC BLD-RTO: 0 /100 WBC
PLATELET # BLD AUTO: 259 K/UL (ref 150–450)
PMV BLD AUTO: 10 FL (ref 9.2–12.9)
POTASSIUM SERPL-SCNC: 5.8 MMOL/L (ref 3.5–5.1)
PROT SERPL-MCNC: 8.8 G/DL (ref 6–8.4)
PROTHROMBIN TIME: 11 SEC (ref 9–12.5)
RBC # BLD AUTO: 4.22 M/UL (ref 4.6–6.2)
SODIUM SERPL-SCNC: 138 MMOL/L (ref 136–145)
WBC # BLD AUTO: 11.42 K/UL (ref 3.9–12.7)

## 2022-03-25 PROCEDURE — 91200 LIVER ELASTOGRAPHY: CPT | Mod: 26,S$PBB,, | Performed by: PHYSICIAN ASSISTANT

## 2022-03-25 PROCEDURE — 80053 COMPREHEN METABOLIC PANEL: CPT | Performed by: PHYSICIAN ASSISTANT

## 2022-03-25 PROCEDURE — 85610 PROTHROMBIN TIME: CPT | Performed by: PHYSICIAN ASSISTANT

## 2022-03-25 PROCEDURE — 85025 COMPLETE CBC W/AUTO DIFF WBC: CPT | Performed by: PHYSICIAN ASSISTANT

## 2022-03-25 PROCEDURE — 86706 HEP B SURFACE ANTIBODY: CPT | Performed by: PHYSICIAN ASSISTANT

## 2022-03-25 PROCEDURE — 91200 LIVER ELASTOGRAPHY: CPT | Mod: PBBFAC | Performed by: PHYSICIAN ASSISTANT

## 2022-03-25 PROCEDURE — 87340 HEPATITIS B SURFACE AG IA: CPT | Performed by: PHYSICIAN ASSISTANT

## 2022-03-25 PROCEDURE — 99999 PR PBB SHADOW E&M-EST. PATIENT-LVL III: CPT | Mod: PBBFAC,,, | Performed by: PHYSICIAN ASSISTANT

## 2022-03-25 PROCEDURE — 86704 HEP B CORE ANTIBODY TOTAL: CPT | Performed by: PHYSICIAN ASSISTANT

## 2022-03-25 PROCEDURE — 99999 PR PBB SHADOW E&M-EST. PATIENT-LVL III: ICD-10-PCS | Mod: PBBFAC,,, | Performed by: PHYSICIAN ASSISTANT

## 2022-03-25 PROCEDURE — 87902 NFCT AGT GNTYP ALYS HEP C: CPT | Performed by: PHYSICIAN ASSISTANT

## 2022-03-25 PROCEDURE — 99214 PR OFFICE/OUTPT VISIT, EST, LEVL IV, 30-39 MIN: ICD-10-PCS | Mod: S$PBB,,, | Performed by: PHYSICIAN ASSISTANT

## 2022-03-25 PROCEDURE — 86790 VIRUS ANTIBODY NOS: CPT | Performed by: PHYSICIAN ASSISTANT

## 2022-03-25 PROCEDURE — 91200 FIBROSCAN (VIBRATION CONTROLLED TRANSIENT ELASTOGRAPHY): ICD-10-PCS | Mod: 26,S$PBB,, | Performed by: PHYSICIAN ASSISTANT

## 2022-03-25 PROCEDURE — 99213 OFFICE O/P EST LOW 20 MIN: CPT | Mod: PBBFAC | Performed by: PHYSICIAN ASSISTANT

## 2022-03-25 PROCEDURE — 99214 OFFICE O/P EST MOD 30 MIN: CPT | Mod: S$PBB,,, | Performed by: PHYSICIAN ASSISTANT

## 2022-03-25 RX ORDER — TAMSULOSIN HYDROCHLORIDE 0.4 MG/1
1 CAPSULE ORAL DAILY
COMMUNITY
Start: 2022-03-23 | End: 2022-01-01

## 2022-03-25 RX ORDER — VELPATASVIR AND SOFOSBUVIR 100; 400 MG/1; MG/1
1 TABLET, FILM COATED ORAL DAILY
Qty: 28 TABLET | Refills: 2 | Status: SHIPPED | OUTPATIENT
Start: 2022-03-25 | End: 2022-01-01

## 2022-03-25 RX ORDER — TRAZODONE HYDROCHLORIDE 50 MG/1
25 TABLET ORAL NIGHTLY PRN
COMMUNITY
Start: 2022-03-23

## 2022-03-25 NOTE — PROGRESS NOTES
HEPATOLOGY VISIT NOTE - HCV clinic  CHIEF COMPLAINT: Hepatitis C   (Here w/ sister)    HISTORY     This is a 66 y.o. White male with chronic hepatitis C    Pertinent PMH of paroxysmal AFib during hospitalization 12/2021 for sepsis syndrome, resp failure due to pneumonia, cardiology note reviewed. On CCB + Eliquis. No amiodarone.    Here for f/u w/ additional labs, liver staging.   Unknown immunity to HAV & HBV - labs to be done today   No evidence of advanced fibrosis.    Feels well  Motivated to have HCV treated  Denies jaundice, dark urine, hematemesis, melena, slowed mentation, abdominal distention.     HCV history:  Recently diagnosed  Prior icteric illnesses: None  Risks for HCV:  Blood transfusion - no    IVDA / Intranasal drug use - no    Tattoos - first one early 20s  - Treatment naive  - Genotype ?  - HCV RNA 3.2 mill IU/mL - 2/2022 (media)    Liver staging:  FibroScan today - kPa 7.8, F2 (, no steatosis)    Labs and imaging reveal no obvious evidence of advanced fibrosis   U/S 2/2022 - liver unremarkable. Spleen not assessed. No ascites   Labs - well preserved liver function. Mild AST elevation. Plt > 200       PMH, PSH, PROBLEM LIST, SOCIAL HX, FAMILY HX, MEDS, ALLERGIES   Reviewed in EPIC  Pertinent findings: paroxysmal AFib, valvular heart disease, pulmonary fibrosis  FAMILY HISTORY: neg for liver disease  SOCIAL HISTORY:   Alcohol - 2-3 margaritas per week but no hx of heavy alcohol use. Does not keep alcohol in house.  Drugs - denies      ROS:   Review of Systems   Respiratory: Positive for shortness of breath (w/ exertion).    Cardiovascular: Negative for chest pain and leg swelling.   Gastrointestinal: Negative for abdominal pain and heartburn.   Skin: Negative for itching.       PHYSICAL EXAM: Friendly WM in no acute distress. Alert and oriented. O2 by NC in place  VITALS: reviewed.   HEENT: Sclerae anicteric.   LUNGS: Normal respiratory effort.   ABDOMEN: Nondistended. Soft. Nontender.    EXTREMITIES: No edema.   SKIN: No jaundice or spider telangectasias. No rashes on exposed skin  NEURO/PSYCH: seated in wheelchair. Memory intact. Thought and speech patterns appropriate. No obvious depression or anxiety.       PERTINENT DIAGNOSTIC RESULTS     Lab Results   Component Value Date    WBC 13.55 (H) 12/21/2021    HGB 12.2 (L) 12/21/2021     12/21/2021     Lab Results   Component Value Date    INR 1.2 12/15/2021     Lab Results   Component Value Date    AST 71 (H) 12/20/2021    ALT 50 12/20/2021    BILITOT 0.2 12/20/2021    ALBUMIN 2.8 (L) 12/20/2021    ALKPHOS 189 (H) 12/20/2021    CREATININE 0.66 12/20/2021    BUN 24 (H) 12/20/2021     12/20/2021    K 4.9 12/20/2021       Outside labs: 2/3/22  WBC 10.3, HGB 13.3,   BUN 11, CREATININE 0.49, , K 4.5, ALBUMIN 3.3, ALKPHOS 189, TOTBILI 0.3, AST 55, ALT 36  HIV - neg      US Abdomen Limited - 2/21/22  Narrative  EXAMINATIONS:  1.  US ABDOMEN LIMITED 02/21/2022 at 07:06  2.  Aortic abdominal ultrasound 02/21/2022 at 07:06    INDICATION:  Hepatitis C clinical history is provided. No history of recent trauma or surgery is provided.    COMPARISON  No recent abdomen ultrasound or CT is currently available.  Renal ultrasound report of 12/16/2021.    FINDINGS  Right upper quadrant ultrasound: No free fluid collections are appreciated. The liver measures 15.3 cm and demonstrates within normal echogenicity. No gross contour deformity is appreciated.  Portal venous flow is within normal.  The common bile duct measures 4.2 mm.  The pancreas is largely obscured. There is bowel gas, penetration artifact present. No echogenic obstructive calculus, wall thickening or pericholecystic fluid is appreciated. No sonographic Coronel sign is provided. The right kidney measures 11.7 x 4.6 x 4.6 cm. No echogenic obstructive calculus or hydronephrosis is appreciated.    Aortic ultrasound: The proximal aorta measures 1.7 x 2.3 cm, mid 1.5 x 1.6 cm and distal  1.4 x 1.5 cm with unremarkable adjacent IVC color Doppler flow demonstrated.  The iliac vessels measure approximately 0.8 cm.  No periaortic fluid collections are currently appreciated.    IMPRESSION  1.  No abdominal aortic aneurysmal dilatation or periaortic fluid collections are appreciated.  2.  No free fluid collection is appreciated.  3.  No echogenic obstructive calculus, biliary dilatation or hydronephrosis is appreciated.      ASSESSMENT     66 y.o. White male with:  1. CHRONIC HEPATITIS C, GENOTYPE ? - treatment naive  -- FibroScan today F2  -- Elevated transaminases  -- Unknown Immunity to HAV & HBV         PLAN     1. Obtain authorization to treat HCV with Epclusa x 12 weeks  -- Rx will be routed to [facility]  -- Patient will notify me of exact treatment start date so appropriate lab f/u can be scheduled.  2. Proceed w/ labs today as planned    ______________________________________________________________________________  EDUCATION:  HCV RX  Discussed goal of HCV eradication to prevent progression of liver disease.  Discussed use of Epclusa daily x 12 weeks w/ potential side effects of fatigue and headache.     Reviewed limitations on acid suppressant medications due to DDI w/ Epclusa:  -- Antacids, H2 Receptor Antagonist, PPI - Pt not taking  Patient instructed to contact me if experiencing acid related symptoms so further recommendations can be made regarding acid suppression therapy.      Herbal / alternative therapies must be discontinued  Discussed importance of medication adherence and risk of treatment failure / viral resistance if not adherent. Pt has verbalized understanding.    ____________________________________________________________________________  Duration of encounter: 32 min  This includes face-to-face time and non face-to-face time preparing to see the patient (eg, review of tests), obtaining and/or reviewing separately obtained history, documenting clinical  information in the electronic or other health record, independently interpreting resultsand communicating results to the patient/family/caregiver, or care coordination.      Cc: Alvnia Ortiz NP

## 2022-03-25 NOTE — TELEPHONE ENCOUNTER
3/25/22 labs reviewed - CMP w/ K+ 5.8  No hemolysis mentioned  Not on meds that would cause this  ?lab error ?needs kayexalate    Called pt. Left VM that I'd like him to return to lab today for STAT BMP

## 2022-03-25 NOTE — TELEPHONE ENCOUNTER
Call placed to pt to [facility] labs today. Labs [facility] for today. Pt stated he will be going shortly.

## 2022-03-25 NOTE — PROCEDURES
FibroScan (Vibration Controlled Transient Elastography)    Date/Time: 3/25/2022 8:15 AM  Performed by: Jennifer B. Scheuermann, PA  Authorized by: Jennifer B. Scheuermann, PA     Diagnosis:  HCV    Probe:  M    Universal Protocol: Patient's identity, procedure and site were verified, confirmatory pause was performed.  Discussed procedure including risks and potential complications.  Questions answered.  Patient verbalizes understanding and wishes to proceed with VCTE.     Procedure: After providing explanations of the procedure, patient was placed in the supine position with right arm in maximum abduction to allow optimal exposure of right lateral abdomen.  Patient was briefly assessed, Testing was performed in the mid-axillary location, 50Hz Shear Wave pulses were applied and the resulting Shear Wave and Propagation Speed detected with a 3.5 MHz ultrasonic signal, using the FibroScan probe, Skin to liver capsule distance and liver parenchyma were accessed during the entire examination with the FibroScan probe, Patient was instructed to breathe normally and to abstain from sudden movements during the procedure, allowing for random measurements of liver stiffness. At least 10 Shear Waves were produced, Individual measurements of each Shear Wave were calculated.  Patient tolerated the procedure well with no complications.  Meets discharge criteria as was dismissed.  Rates pain 0 out of 10.  Patient will follow up with ordering provider to review results.      Findings  Median liver stiffness score:  7.8  CAP Reading: dB/m:  107    IQR/med %:  22  Interpretation  Fibrosis interpretation is based on medial liver stiffness - Kilopascal (kPa).    Fibrosis Stage:  F2  Steatosis interpretation is based on controlled attenuation parameter - (dB/m).    Steatosis Grade:  <S1

## 2022-03-31 ENCOUNTER — PATIENT MESSAGE (OUTPATIENT)
Dept: HEPATOLOGY | Facility: CLINIC | Age: 67
End: 2022-03-31
Payer: MEDICAID

## 2022-03-31 LAB
HCV GENTYP SERPL NAA+PROBE: ABNORMAL
HCV RNA SERPL NAA+PROBE-LOG IU: 5.57 LOG (10) IU/ML
HCV RNA SERPL QL NAA+PROBE: DETECTED
HCV RNA SPEC NAA+PROBE-ACNC: ABNORMAL IU/ML

## 2022-04-01 ENCOUNTER — TELEPHONE (OUTPATIENT)
Dept: HEPATOLOGY | Facility: CLINIC | Age: 67
End: 2022-04-01
Payer: MEDICAID

## 2022-04-01 DIAGNOSIS — Z23 NEED FOR HEPATITIS VACCINATION: Primary | ICD-10-CM

## 2022-04-01 NOTE — TELEPHONE ENCOUNTER
----- Message from Jennifer B. Scheuermann, PA sent at 4/1/2022  9:15 AM CDT -----  Pls schedule HBV vaccine series. If can't schedule near him he will need to ask PCP.

## 2022-04-01 NOTE — TELEPHONE ENCOUNTER
----- Message from Coral Kiser sent at 4/1/2022  9:32 AM CDT -----  Contact: IZABELA NOEL [[id number]]  Type: Patient Call Back    Who called:IZABELA NOEL [[id number]]    What is the request in detail: The patient would like a call In regards to his hep c/b results     Can the clinic reply by [facility]?    Would the patient rather a call back or a response via [facility]?     Best call back number: [phone] (mobile)    Additional Information:

## 2022-04-01 NOTE — TELEPHONE ENCOUNTER
Attempt made to reach patient for scheduling.  Msg from PA Scheuermann left on his VM and mailed to him.  I stress that he call for scheduling.

## 2022-04-01 NOTE — TELEPHONE ENCOUNTER
I spoke with patient and msg from PA Scheuermann regarding the needs for Hep B Vaccine series relayed.  Patient states that he will have vaccine given by PCP and will call us back if PCP will not administer.

## 2022-04-22 ENCOUNTER — OFFICE VISIT (OUTPATIENT)
Dept: CARDIOLOGY | Facility: CLINIC | Age: 67
End: 2022-04-22
Payer: MEDICARE

## 2022-04-22 VITALS
SYSTOLIC BLOOD PRESSURE: 150 MMHG | BODY MASS INDEX: 20.17 KG/M2 | HEART RATE: 80 BPM | HEIGHT: 65 IN | WEIGHT: 121.06 LBS | DIASTOLIC BLOOD PRESSURE: 86 MMHG

## 2022-04-22 DIAGNOSIS — I48.91 ATRIAL FIBRILLATION WITH RAPID VENTRICULAR RESPONSE: ICD-10-CM

## 2022-04-22 DIAGNOSIS — R74.8 ELEVATED CK: ICD-10-CM

## 2022-04-22 DIAGNOSIS — R93.89 ABNORMAL CT OF THE CHEST: ICD-10-CM

## 2022-04-22 DIAGNOSIS — N17.9 AKI (ACUTE KIDNEY INJURY): ICD-10-CM

## 2022-04-22 DIAGNOSIS — Z00.00 ROUTINE MEDICAL EXAM: Primary | ICD-10-CM

## 2022-04-22 DIAGNOSIS — J84.10 PULMONARY FIBROSIS: ICD-10-CM

## 2022-04-22 DIAGNOSIS — Z72.0 TOBACCO ABUSE: ICD-10-CM

## 2022-04-22 DIAGNOSIS — B18.2 CHRONIC HEPATITIS C WITHOUT HEPATIC COMA: ICD-10-CM

## 2022-04-22 PROCEDURE — 99214 PR OFFICE/OUTPT VISIT, EST, LEVL IV, 30-39 MIN: ICD-10-PCS | Mod: S$PBB,,, | Performed by: INTERNAL MEDICINE

## 2022-04-22 PROCEDURE — 93005 ELECTROCARDIOGRAM TRACING: CPT | Mod: PO

## 2022-04-22 PROCEDURE — 99213 OFFICE O/P EST LOW 20 MIN: CPT | Mod: PBBFAC,PO | Performed by: INTERNAL MEDICINE

## 2022-04-22 PROCEDURE — 99999 PR PBB SHADOW E&M-EST. PATIENT-LVL III: CPT | Mod: PBBFAC,,, | Performed by: INTERNAL MEDICINE

## 2022-04-22 PROCEDURE — 93010 ELECTROCARDIOGRAM REPORT: CPT | Mod: ,,, | Performed by: INTERNAL MEDICINE

## 2022-04-22 PROCEDURE — 93010 EKG 12-LEAD: ICD-10-PCS | Mod: ,,, | Performed by: INTERNAL MEDICINE

## 2022-04-22 PROCEDURE — 99999 PR PBB SHADOW E&M-EST. PATIENT-LVL III: ICD-10-PCS | Mod: PBBFAC,,, | Performed by: INTERNAL MEDICINE

## 2022-04-22 PROCEDURE — 99214 OFFICE O/P EST MOD 30 MIN: CPT | Mod: S$PBB,,, | Performed by: INTERNAL MEDICINE

## 2022-04-22 NOTE — PROGRESS NOTES
Subjective:    Patient ID:  Magdy Austin is a 66 y.o. male patient here for evaluation No chief complaint on file.      History of Present Illness:PULM FIBROSIS, NEG NI CV EVAL, EF  45%  PAF ON ELIQUIS  NO BLEEDING ISSUES  NO CP  LUNDBERG W/O CHG    FOR CAT EYE SX           Review of patient's allergies indicates:  No Known Allergies    Past Medical History:   Diagnosis Date    Chronic hepatitis C     Dysrhythmia     Medical non-compliance      History reviewed. No pertinent surgical history.  Social History     Tobacco Use    Smoking status: Former Smoker     Packs/day: 1.50     Years: 30.00     Pack years: 45.00     Types: Cigarettes     Quit date: 2021     Years since quittin.3    Smokeless tobacco: Never Used    Tobacco comment: used to smoke 2 ppd now down to 1 ppd   Substance Use Topics    Alcohol use: Yes     Comment: once a week    Drug use: Never        Review of Systems:    As noted in HPI in addition      REVIEW OF SYSTEMS  Review of Systems   Constitutional: Negative for decreased appetite, diaphoresis, night sweats, weight gain and weight loss.   HENT: Negative for nosebleeds and odynophagia.    Eyes: Negative for double vision and photophobia.   Cardiovascular: Positive for dyspnea on exertion. Negative for chest pain, claudication, cyanosis, irregular heartbeat, leg swelling, near-syncope, orthopnea, palpitations, paroxysmal nocturnal dyspnea and syncope.   Respiratory: Negative for cough, hemoptysis, shortness of breath and wheezing.    Hematologic/Lymphatic: Negative for adenopathy.   Skin: Negative for flushing, skin cancer and suspicious lesions.   Musculoskeletal: Negative for gout, myalgias and neck pain.   Gastrointestinal: Negative for abdominal pain, heartburn, hematemesis and hematochezia.   Genitourinary: Negative for bladder incontinence, hesitancy and nocturia.   Neurological: Negative for focal weakness, headaches, light-headedness and paresthesias.    Psychiatric/Behavioral: Negative for memory loss and substance abuse.              Objective:        Vitals:    04/22/22 1310   BP: (!) 150/86   Pulse: 80       Lab Results   Component Value Date    WBC 11.42 03/25/2022    HGB 12.6 (L) 03/25/2022    HCT 42.1 03/25/2022     03/25/2022    CHOL 71 (L) 12/16/2021    TRIG 115 12/16/2021    HDL 14 (L) 12/16/2021    ALT 20 03/25/2022    AST 26 03/25/2022     (L) 03/25/2022    K 4.2 03/25/2022    CL 98 03/25/2022    CREATININE 0.5 03/25/2022    BUN 14 03/25/2022    CO2 26 03/25/2022    TSH 0.654 12/16/2021    INR 1.1 03/25/2022    HGBA1C 5.5 12/16/2021        ECHOCARDIOGRAM RESULTS  Results for orders placed during the hospital encounter of 12/15/21    Echo    Interpretation Summary  · Mild left atrial enlargement.  · The left ventricle is normal in size with mildly decreased systolic function.  · Grade I left ventricular diastolic dysfunction.  · The estimated PA systolic pressure is 14 mmHg.  · Normal central venous pressure (3 mmHg).  · The estimated ejection fraction is 45%.  · There is abnormal septal wall motion.  · Mild right atrial enlargement.  · Mild mitral regurgitation.  · Mild tricuspid regurgitation.        CURRENT/PREVIOUS VISIT EKG  Results for orders placed or performed during the hospital encounter of 12/15/21   EKG 12-lead    Collection Time: 12/15/21  8:35 PM    Narrative    Test Reason : I48.91,    Vent. Rate : 094 BPM     Atrial Rate : 094 BPM     P-R Int : 136 ms          QRS Dur : 084 ms      QT Int : 386 ms       P-R-T Axes : 071 -09 044 degrees     QTc Int : 482 ms    Normal sinus rhythm with sinus arrhythmia  Anterior infarct (cited on or before 15-DEC-2021)  Abnormal ECG  When compared with ECG of 15-DEC-2021 17:29,  Sinus rhythm has replaced Atrial fibrillation  Vent. rate has decreased BY  91 BPM  Questionable change in initial forces of Septal leads  Confirmed by Jaren Finn MD ([id number]) on 12/16/2021 9:55:38 AM    Referred By:  [email or web address]   SELF           Confirmed By:Jaren Finn MD     No valid procedures specified.   Results for orders placed during the hospital encounter of 12/15/21    Nuclear Stress - Cardiology Interpreted    Interpretation Summary    Normal myocardial perfusion scan. There is no evidence of myocardial ischemia or infarction.    There is trivial apical thinning which is a normal variant.    The gated perfusion images showed an ejection fraction of 44% at rest.    The EKG portion of this study is negative for ischemia.    The patient reported no chest pain during the stress test.    During stress, occasional PVCs are noted.    No valid procedures specified.    PHYSICAL EXAM  CONSTITUTIONAL: Well built, well nourished in no apparent distress  NECK: no carotid bruit, no JVD  LUNGS: DEC  BS  CHEST WALL: no tenderness,  HEART: regular rate and rhythm, S1, S2 normal, no murmur, click, rub or gallop   ABDOMEN: soft, non-tender; bowel sounds normal; no masses,  no organomegaly  EXTREMITIES: Extremities normal, no edema, no calf tenderness noted  NEURO: AAO X 3    I HAVE REVIEWED :    The vital signs, nurses notes, and all the pertinent radiology and labs.         Current Outpatient Medications   Medication Instructions    albuterol (PROVENTIL) 2.5 mg, Nebulization, Every 6 hours PRN, Rescue    albuterol (PROVENTIL/VENTOLIN HFA) 90 mcg/actuation inhaler 2 puffs, Inhalation, Every 6 hours PRN, Rescue    apixaban (ELIQUIS) 5 mg, Oral, 2 times daily    aspirin (ECOTRIN) 81 mg, Oral, Daily    diltiaZEM (CARDIZEM CD) 120 mg, Oral, Daily    EPCLUSA 400-100 mg Tab 1 tablet, Oral, Daily    hydrALAZINE (APRESOLINE) 25 mg, Oral, 3 times daily    levalbuterol (XOPENEX) 0.63 mg, Nebulization, Every 6 hours PRN, Rescue    nicotine (NICODERM CQ) 21 mg/24 hr 1 patch, Transdermal, Daily    ondansetron (ZOFRAN-ODT) 4 mg, Oral, Every 8 hours PRN    tamsulosin (FLOMAX) 0.4 mg Cap 1 capsule, Oral, Daily    traZODone (DESYREL)  50 MG tablet TAKE 0.5 TABLETS EVERY DAY BY MOUTH AT BEDTIME    TRELEGY ELLIPTA 100-62.5-25 mcg DsDv 1 puff, Oral, Daily          Assessment:   PRE OP CAT SX   LOW RISK  PAF  PULM FIBROSIS  EKG TODAY STABLE, NSR      Plan:      RUDOLPH DISCUSSED  4M        No follow-ups on file.

## 2022-04-25 ENCOUNTER — PATIENT MESSAGE (OUTPATIENT)
Dept: CARDIOLOGY | Facility: CLINIC | Age: 67
End: 2022-04-25
Payer: MEDICAID

## 2022-05-02 ENCOUNTER — TELEPHONE (OUTPATIENT)
Dept: CARDIOLOGY | Facility: CLINIC | Age: 67
End: 2022-05-02
Payer: MEDICAID

## 2022-05-02 NOTE — TELEPHONE ENCOUNTER
----- Message from Mendel Peguero sent at 5/2/2022  4:18 PM CDT -----  Contact: Radha De La Rosa  Type: Needs Medical Advice    Who Called: Radha De La Rosa   Best Call Back Number: [phone] [id number]  Additional Information: Requesting callback regarding seeing if pt needs to be off Elqis for Sx procedure tomorrow please call back asap, CV Clearance    Please Advise-Thank you

## 2022-05-05 ENCOUNTER — SPECIALTY PHARMACY (OUTPATIENT)
Dept: PHARMACY | Facility: CLINIC | Age: 67
End: 2022-05-05
Payer: MEDICAID

## 2022-05-05 NOTE — TELEPHONE ENCOUNTER
New Rx received for Epclusa. Claim goes through for $4 copay. However, notes states this is a transitional fill. Will submit PA to ensure no gaps in therapy later on. PA submitted via CM. Key: [id number] - PA Case ID: [id number]

## 2022-05-06 ENCOUNTER — TELEPHONE (OUTPATIENT)
Dept: HEPATOLOGY | Facility: CLINIC | Age: 67
End: 2022-05-06
Payer: MEDICAID

## 2022-05-06 NOTE — TELEPHONE ENCOUNTER
----- Message from Jennifer B. Scheuermann, PA sent at 5/6/2022  2:04 PM CDT -----  HCV med will not affect surgery or healing      ----- Message -----  From: Galina Brown RN  Sent: 5/6/2022   8:18 AM CDT  To: Jennifer B. Scheuermann, PA    Patient having cataract surgery on 5/17/22. [facility] has not shipped hep c med.  Auth pending.  He wants to make sure hep c treatment will not affect surgery and healing.

## 2022-05-06 NOTE — TELEPHONE ENCOUNTER
Epclusa PA approved from 4/5/2022 - 7/28/2022. PA Case ID: [id number]. Test claim: $4 copay. Pt has Medicare insurance. No BI/FA needed.

## 2022-05-09 ENCOUNTER — SPECIALTY PHARMACY (OUTPATIENT)
Dept: PHARMACY | Facility: CLINIC | Age: 67
End: 2022-05-09
Payer: MEDICAID

## 2022-05-09 DIAGNOSIS — B18.2 CHRONIC HEPATITIS C WITHOUT HEPATIC COMA: Primary | ICD-10-CM

## 2022-05-09 NOTE — TELEPHONE ENCOUNTER
Reached pt regarding Epclusa initial consultation. Pt was on a call on the other line and requested a call back tomorrow.

## 2022-05-10 NOTE — TELEPHONE ENCOUNTER
Specialty Pharmacy - Initial Clinical Assessment - Epclusa fill 1 of 3    Specialty Medication Orders Linked to Encounter    Flowsheet Row Most Recent Value   Medication #1 EPCLUSA 400-100 mg Tab (Order#[id number], Rx#[id number])        Patient Diagnosis   B18.2 - Chronic hepatitis C without hepatic coma    Subjective    Magdy Austin is a 66 y.o. male, who is followed by the specialty pharmacy service for management and education.    Recent Encounters     Date Type Provider Description    05/09/2022 Specialty Pharmacy Carissa Yates, Eve Initial Clinical Assessment    05/05/2022 Specialty Pharmacy Carissa Yates, Eve Referral Authorization        Clinical call attempts since last clinical assessment   5/9/2022  8:11 PM - Specialty Pharmacy - Clinical Assessment by Carissa Yates, Eve     Current Outpatient Medications   Medication Sig    albuterol (PROVENTIL) 2.5 mg /3 mL (0.083 %) nebulizer solution Take 3 mLs (2.5 mg total) by nebulization every 6 (six) hours as needed for Wheezing. Rescue    albuterol (PROVENTIL/VENTOLIN HFA) 90 mcg/actuation inhaler INHALE 2 PUFFS INTO THE LUNGS EVERY 6 HOURS AS NEEDED FOR WHEEZING. RESCUE    apixaban (ELIQUIS) 5 mg Tab Take 1 tablet (5 mg total) by mouth 2 (two) times daily.    aspirin (ECOTRIN) 81 MG EC tablet Take 1 tablet (81 mg total) by mouth once daily.    diltiaZEM (CARDIZEM CD) 120 MG Cp24 Take 1 capsule (120 mg total) by mouth once daily.    hydrALAZINE (APRESOLINE) 25 MG tablet Take 1 tablet (25 mg total) by mouth 3 (three) times daily.    levalbuterol (XOPENEX) 0.63 mg/3 mL nebulizer solution Take 3 mLs (0.63 mg total) by nebulization every 6 (six) hours as needed for Wheezing. Rescue    ondansetron (ZOFRAN-ODT) 4 MG TbDL Take 1 tablet (4 mg total) by mouth every 8 (eight) hours as needed (nausea).    tamsulosin (FLOMAX) 0.4 mg Cap Take 1 capsule by mouth once daily.    traZODone (DESYREL) 50 MG tablet TAKE 0.5 TABLETS EVERY DAY BY  MOUTH AT BEDTIME    TRELEGY ELLIPTA 100-62.5-25 mcg DsDv Take 1 puff by mouth once daily.    EPCLUSA 400-100 mg Tab Take 1 tablet by mouth once daily.    nicotine (NICODERM CQ) 21 mg/24 hr Place 1 patch onto the skin once daily.   Last reviewed on 4/22/2022  1:26 PM by Navjot Simms MD    Review of patient's allergies indicates:  No Known AllergiesLast reviewed on  4/22/2022 1:28 PM by John Gerber    Drug Interactions    Drug interactions evaluated: yes  Clinically relevant drug interactions identified: no  Provided the patient with educational material regarding drug interactions: not applicable           Assessment Questions - Documented Responses    Flowsheet Row Most Recent Value   Assessment    Medication Reconciliation completed for patient Yes   During the past 4 weeks, has patient missed any activities due to condition or medication? No   During the past 4 weeks, did patient have any of the following urgent care visits? None   Goals of Therapy Status Discussed (new start)   Status of the patients ability to self-administer: Is Able   All education points have been covered with patient? Yes, supplemental printed education provided   Welcome packet contents reviewed and discussed with patient? Yes   Assesment completed? Yes   Plan Therapy being initiated   Do you need to open a clinical intervention (i-vent)? No   Do you want to schedule first shipment? Yes   Medication #1 Assessment Info    Patient status New medication, New to [facility]   Is this medication appropriate for the patient? Yes   Is this medication effective? Not yet started        Refill Questions - Documented Responses    Flowsheet Row Most Recent Value   Patient Availability and HIPAA Verification    Does patient want to proceed with activity? Yes   HIPAA/medical authority confirmed? Yes   Relationship to patient of person spoken to? Self   Refill Screening Questions    When does the patient need to receive the medication? 05/12/22  "  Refill Delivery Questions    How will the patient receive the medication? Delivery Jessica   When does the patient need to receive the medication? 05/12/22   Shipping Address Home   Address in [facility] confirmed and updated if neccessary? Yes   Expected Copay ($) 0   Is the patient able to afford the medication copay? Yes   Payment Method zero copay   Days supply of Refill 28   Supplies needed? No supplies needed   Refill activity completed? Yes   Refill activity plan Refill scheduled   Shipment/Pickup Date: 05/11/22          Objective    He has a past medical history of Chronic hepatitis C, Dysrhythmia, and Medical non-compliance.    Tried/failed medications: None    BP Readings from Last 4 Encounters:   04/22/22 (!) 150/86   03/25/22 (!) 148/83   03/17/22 100/80   02/07/22 (!) 138/93     Ht Readings from Last 4 Encounters:   04/22/22 5' 5" (1.651 m)   03/17/22 5' 5" (1.651 m)   02/04/22 5' 5" (1.651 m)   12/16/21 5' 5" (1.651 m)     Wt Readings from Last 4 Encounters:   04/22/22 54.9 kg (121 lb 0.5 oz)   03/25/22 54.4 kg (120 lb)   03/17/22 52.2 kg (115 lb)   02/07/22 49.6 kg (109 lb 5.6 oz)     No results found for: HCVGENOTYPE  Recent Labs   Lab Result Units 03/25/22  1433 03/25/22  0850   Creatinine mg/dL 0.5 0.6   ALT U/L  --  20   AST U/L  --  26   Hepatitis B Surface Ag   --  Negative   Hep B S Ab   --  Negative   Hep B Core Total Ab   --  Negative     The goals of Hepatitis C Virus (HCV) infection treatment include:  · Reducing all-cause mortality and liver-related health adverse consequences, including cirrhosis, end-stage liver disease, and hepatocellular carcinoma  · Achieving virologic cure as evidenced by a sustained virologic response  · Improving or maintaining quality of life  · Maintaining optimal therapy adherence  · Minimizing and managing side effects  · Preventing the transmission of HCV      Goals of Therapy Status: Discussed (new start)    Assessment/Plan  Patient plans to start " therapy on 05/12/22      Indication, dosage, appropriateness, effectiveness, safety and convenience of his specialty medication(s) were reviewed today.     Patient Education   Patient received education on the following:    Expectations and possible outcomes of therapy   Proper use, timely administration, and missed dose management   Duration of therapy   Side effects, including prevention, minimization, and management   Contraindications and safety precautions   New or changed medications, including prescribe and over the counter medications and supplements   Reviews recommended vaccinations, as appropriate   Storage, safe handling, and disposal    -Patient plans to take Epclusa around 7 AM daily and will use a pill organizer to help him keep track of his doses.     Tasks added this encounter   6/2/2022 - Refill Call (Auto Added)  5/19/2022 - 7 Day Post Start Touchbase   Tasks due within next 3 months   No tasks due.     Carissa Yates, PharmD  [facility]  [address] [place], [place] [address]  [facility] [address]  Phone: [phone]  Fax: [phone]

## 2022-05-12 ENCOUNTER — TELEPHONE (OUTPATIENT)
Dept: HEPATOLOGY | Facility: CLINIC | Age: 67
End: 2022-05-12
Payer: MEDICAID

## 2022-05-12 DIAGNOSIS — B18.2 CHRONIC HEPATITIS C WITHOUT HEPATIC COMA: Primary | ICD-10-CM

## 2022-05-12 NOTE — TELEPHONE ENCOUNTER
Pt beginning 12 weeks of Epclusa on 5/12/22  Anticipated treatment end date:  Celeste ?  Treatment naive  F2      Please review with patient:   Hepatitis C treatment with Epclusa will last 12 weeks. This means TWO more shipments of Epclusa will come from pharmacy. If she has any problems getting future shipments please call us right away!   After treatment ends there is a small chance the virus can return. We will do lab work 3 months after treatment ends to see if virus is cured.    Please update episode & schedule labs to see if the virus is cured  - LFT, HCV RNA - SVR12

## 2022-05-13 NOTE — TELEPHONE ENCOUNTER
I spoke with patient and msg from PA Scheuermann relayed and mailed to him.  Lab draw scheduled 10/26/22; appt reminder notice mailed.

## 2022-05-20 ENCOUNTER — SPECIALTY PHARMACY (OUTPATIENT)
Dept: PHARMACY | Facility: CLINIC | Age: 67
End: 2022-05-20
Payer: MEDICAID

## 2022-05-20 NOTE — TELEPHONE ENCOUNTER
7 Day Touchbase - Documented Responses    Flowsheet Row Most Recent Value   Have you started taking your medication? Yes   What day did you start? 05/12/22   How are you feeling?  Pt states that he is feeling well since start Epclusa and denies any side effects so far.   How are you taking your medication? Are you having any problems taking your medication? Pt is taking Epclusa - 1 tablet by mouth once daily around 7 AM. Pt is storing medication in his pill organizer which helps him to ensure no missed/late doses.   Do you have any questions or concerns that we can help you with today? Pt had no further questions or concerns at this time.        Carissa Yates, PharmD  [facility]  [address] [facility] [address]  Phone: [phone]  Fax: [phone]

## 2022-06-01 ENCOUNTER — SPECIALTY PHARMACY (OUTPATIENT)
Dept: PHARMACY | Facility: CLINIC | Age: 67
End: 2022-06-01
Payer: MEDICAID

## 2022-06-01 NOTE — TELEPHONE ENCOUNTER
Specialty Pharmacy - Refill Coordination (BRAND EPCLUSA REFILL 2 of 3)    Specialty Medication Orders Linked to Encounter    Flowsheet Row Most Recent Value   Medication #1 EPCLUSA 400-100 mg Tab (Order#[id number], Rx#[id number])        BRAND Epclusa refill (2 of 3) confirmed and reassessment complete. Confirmed 2 patient identifiers - name and . Therapy appropriate.     Patient has 6 doses of Epclusa remaining and takes it daily. Pt reports they are not having any side effects so far. No missed doses, no new medications, no new allergies or health conditions reported at this time.     Refill Questions - Documented Responses    Flowsheet Row Most Recent Value   Patient Availability and HIPAA Verification    Does patient want to proceed with activity? Yes   HIPAA/medical authority confirmed? Yes   Relationship to patient of person spoken to? Self   Refill Screening Questions    Changes to allergies? No   Changes to medications? No   New conditions since last clinic visit? No   Unplanned office visit, urgent care, ED, or hospital admission in the last 4 weeks? No   How does patient/caregiver feel medication is working? Too soon to tell   Financial problems or insurance changes? No   How many doses of your specialty medications were missed in the last 4 weeks? 0   Would patient like to speak to a pharmacist? No   When does the patient need to receive the medication? 22   Refill Delivery Questions    How will the patient receive the medication? Delivery Jessica   When does the patient need to receive the medication? 22   Shipping Address Home   Address in [facility] confirmed and updated if neccessary? Yes   Expected Copay ($) 0   Is the patient able to afford the medication copay? Yes   Payment Method zero copay   Days supply of Refill 28   Supplies needed? No supplies needed   Refill activity completed? Yes   Refill activity plan Refill scheduled   Shipment/Pickup Date: 22          Current  Outpatient Medications   Medication Sig    albuterol (PROVENTIL) 2.5 mg /3 mL (0.083 %) nebulizer solution Take 3 mLs (2.5 mg total) by nebulization every 6 (six) hours as needed for Wheezing. Rescue    albuterol (PROVENTIL/VENTOLIN HFA) 90 mcg/actuation inhaler INHALE 2 PUFFS INTO THE LUNGS EVERY 6 HOURS AS NEEDED FOR WHEEZING. RESCUE    apixaban (ELIQUIS) 5 mg Tab Take 1 tablet (5 mg total) by mouth 2 (two) times daily.    aspirin (ECOTRIN) 81 MG EC tablet Take 1 tablet (81 mg total) by mouth once daily.    diltiaZEM (CARDIZEM CD) 120 MG Cp24 Take 1 capsule (120 mg total) by mouth once daily.    EPCLUSA 400-100 mg Tab Take 1 tablet by mouth once daily.    hydrALAZINE (APRESOLINE) 25 MG tablet Take 1 tablet (25 mg total) by mouth 3 (three) times daily.    levalbuterol (XOPENEX) 0.63 mg/3 mL nebulizer solution Take 3 mLs (0.63 mg total) by nebulization every 6 (six) hours as needed for Wheezing. Rescue    nicotine (NICODERM CQ) 21 mg/24 hr Place 1 patch onto the skin once daily.    ondansetron (ZOFRAN-ODT) 4 MG TbDL Take 1 tablet (4 mg total) by mouth every 8 (eight) hours as needed (nausea).    tamsulosin (FLOMAX) 0.4 mg Cap Take 1 capsule by mouth once daily.    traZODone (DESYREL) 50 MG tablet TAKE 0.5 TABLETS EVERY DAY BY MOUTH AT BEDTIME    TRELEGY ELLIPTA 100-62.5-25 mcg DsDv Take 1 puff by mouth once daily.   Last reviewed on 4/22/2022  1:26 PM by Navjot Simms MD    Review of patient's allergies indicates:  No Known Allergies Last reviewed on  4/22/2022 1:28 PM by John Gerber      Tasks added this encounter   6/28/2022 - Refill Call (Auto Added)   Tasks due within next 3 months   No tasks due.     Ct Jiménez, PharmD  [facility]  [address] [place] [id number]  Phone: [phone]  Fax: [phone]

## 2022-06-27 NOTE — TELEPHONE ENCOUNTER
Specialty Pharmacy - Refill Coordination    Specialty Medication Orders Linked to Encounter    Flowsheet Row Most Recent Value   Medication #1 EPCLUSA 400-100 mg Tab (Order#[id number], Rx#[id number])          Refill Questions - Documented Responses    Flowsheet Row Most Recent Value   Patient Availability and HIPAA Verification    Does patient want to proceed with activity? Yes   HIPAA/medical authority confirmed? Yes   Relationship to patient of person spoken to? Family Member   Refill Screening Questions    Changes to allergies? No   Changes to medications? No   New conditions since last clinic visit? No   Unplanned office visit, urgent care, ED, or hospital admission in the last 4 weeks? No   How does patient/caregiver feel medication is working? Excellent   Financial problems or insurance changes? No   How many doses of your specialty medications were missed in the last 4 weeks? 0   Would patient like to speak to a pharmacist? No   When does the patient need to receive the medication? 07/06/22   Refill Delivery Questions    How will the patient receive the medication? Delivery Jessica   When does the patient need to receive the medication? 07/06/22   Shipping Address Home   Address in [facility] confirmed and updated if neccessary? Yes   Expected Copay ($) 0   Is the patient able to afford the medication copay? Yes   Payment Method zero copay   Days supply of Refill 28   Supplies needed? No supplies needed   Refill activity completed? Yes   Refill activity plan Refill scheduled   Shipment/Pickup Date: 06/29/22          Current Outpatient Medications   Medication Sig    albuterol (PROVENTIL) 2.5 mg /3 mL (0.083 %) nebulizer solution Take 3 mLs (2.5 mg total) by nebulization every 6 (six) hours as needed for Wheezing. Rescue    albuterol (PROVENTIL/VENTOLIN HFA) 90 mcg/actuation inhaler INHALE 2 PUFFS INTO THE LUNGS EVERY 6 HOURS AS NEEDED FOR WHEEZING. RESCUE    apixaban (ELIQUIS) 5 mg Tab Take 1 tablet (5  mg total) by mouth 2 (two) times daily.    aspirin (ECOTRIN) 81 MG EC tablet Take 1 tablet (81 mg total) by mouth once daily.    diltiaZEM (CARDIZEM CD) 120 MG Cp24 Take 1 capsule (120 mg total) by mouth once daily.    EPCLUSA 400-100 mg Tab Take 1 tablet by mouth once daily.    hydrALAZINE (APRESOLINE) 25 MG tablet Take 1 tablet (25 mg total) by mouth 3 (three) times daily.    levalbuterol (XOPENEX) 0.63 mg/3 mL nebulizer solution Take 3 mLs (0.63 mg total) by nebulization every 6 (six) hours as needed for Wheezing. Rescue    nicotine (NICODERM CQ) 21 mg/24 hr Place 1 patch onto the skin once daily.    ondansetron (ZOFRAN-ODT) 4 MG TbDL Take 1 tablet (4 mg total) by mouth every 8 (eight) hours as needed (nausea).    tamsulosin (FLOMAX) 0.4 mg Cap Take 1 capsule by mouth once daily.    traZODone (DESYREL) 50 MG tablet TAKE 0.5 TABLETS EVERY DAY BY MOUTH AT BEDTIME    TRELEGY ELLIPTA 100-62.5-25 mcg DsDv Take 1 puff by mouth once daily.   Last reviewed on 4/22/2022  1:26 PM by Navjot Simms MD    Review of patient's allergies indicates:  No Known Allergies Last reviewed on  4/22/2022 1:28 PM by John Gerber      Tasks added this encounter   7/27/2022 - Refill Call (Auto Added)   Tasks due within next 3 months   No tasks due.     María Spence, PharmD  [facility]  [address] [place] [address]  Phone: [phone]  Fax: [phone]

## 2022-08-23 NOTE — PROGRESS NOTES
Subjective:    Patient ID:  Magdy Austin is a 66 y.o. male patient here for evaluation Follow-up (4mo)      History of Present Illness:  Cardiology follow-up,  Paroxysmal atrial fibrillation on chronic oral anticoagulation.  Patient clinically in normal sinus rhythm today.  Noninvasive cardiac assessment  with negative nuclear study.  Dobutamine stress, EF at that time by echo was 45%.     Stable LUNDBERG, underlying pulmonary fibrosis.  No angina chest pain.  No discernible arrhythmia.  No syncope/presyncope.        Review of patient's allergies indicates:  No Known Allergies    Past Medical History:   Diagnosis Date    Chronic hepatitis C     Dysrhythmia     Medical non-compliance      No past surgical history on file.  Social History     Tobacco Use    Smoking status: Former Smoker     Packs/day: 1.50     Years: 30.00     Pack years: 45.00     Types: Cigarettes     Quit date: 2021     Years since quittin.6    Smokeless tobacco: Never Used    Tobacco comment: used to smoke 2 ppd now down to 1 ppd   Substance Use Topics    Alcohol use: Yes     Comment: once a week    Drug use: Never        Review of Systems:    As noted in HPI in addition      REVIEW OF SYSTEMS  Review of Systems   Constitutional: Negative for decreased appetite, diaphoresis, night sweats, weight gain and weight loss.   HENT: Negative for nosebleeds and odynophagia.    Eyes: Negative for double vision and photophobia.   Cardiovascular: Negative for chest pain, claudication, cyanosis, dyspnea on exertion, irregular heartbeat, leg swelling, near-syncope, orthopnea, palpitations, paroxysmal nocturnal dyspnea and syncope.   Respiratory: Positive for shortness of breath. Negative for cough, hemoptysis and wheezing.    Hematologic/Lymphatic: Negative for adenopathy.   Skin: Negative for flushing, skin cancer and suspicious lesions.   Musculoskeletal: Negative for gout, myalgias and neck pain.   Gastrointestinal: Negative for  abdominal pain, heartburn, hematemesis and hematochezia.   Genitourinary: Negative for bladder incontinence, hesitancy and nocturia.   Neurological: Negative for focal weakness, headaches, light-headedness and paresthesias.   Psychiatric/Behavioral: Negative for memory loss and substance abuse.              Objective:        Vitals:    08/23/22 1324   BP: (!) 156/84   Pulse: (!) 111       Lab Results   Component Value Date    WBC 11.42 03/25/2022    HGB 12.6 (L) 03/25/2022    HCT 42.1 03/25/2022     03/25/2022    CHOL 71 (L) 12/16/2021    TRIG 115 12/16/2021    HDL 14 (L) 12/16/2021    ALT 20 03/25/2022    AST 26 03/25/2022     (L) 03/25/2022    K 4.2 03/25/2022    CL 98 03/25/2022    CREATININE 0.5 03/25/2022    BUN 14 03/25/2022    CO2 26 03/25/2022    TSH 0.654 12/16/2021    INR 1.1 03/25/2022    HGBA1C 5.5 12/16/2021        ECHOCARDIOGRAM RESULTS  Results for orders placed during the hospital encounter of 12/15/21    Echo    Interpretation Summary  · Mild left atrial enlargement.  · The left ventricle is normal in size with mildly decreased systolic function.  · Grade I left ventricular diastolic dysfunction.  · The estimated PA systolic pressure is 14 mmHg.  · Normal central venous pressure (3 mmHg).  · The estimated ejection fraction is 45%.  · There is abnormal septal wall motion.  · Mild right atrial enlargement.  · Mild mitral regurgitation.  · Mild tricuspid regurgitation.        CURRENT/PREVIOUS VISIT EKG  Results for orders placed or performed in visit on 04/22/22   IN OFFICE EKG 12-LEAD (to [facility])    Collection Time: 04/22/22 12:21 PM    Narrative    Test Reason : Z00.00    Vent. Rate : 077 BPM     Atrial Rate : 077 BPM     P-R Int : 144 ms          QRS Dur : 076 ms      QT Int : 406 ms       P-R-T Axes : 085 -51 033 degrees     QTc Int : 459 ms    Sinus rhythm with Premature supraventricular complexes  Left anterior fascicular block  Abnormal ECG  When compared with ECG of 15-DEC-2021  20:35,  Premature supraventricular complexes are now Present  Left anterior fascicular block is now Present  T wave inversion no longer evident in Anterior leads  Confirmed by DANN CANTOR MD ([id number]) on 4/25/2022 8:48:18 AM    Referred By: MAYNOR CHINO           Confirmed By:DANN CANTOR MD     No valid procedures specified.   Results for orders placed during the hospital encounter of 12/15/21    Nuclear Stress - Cardiology Interpreted    Interpretation Summary    Normal myocardial perfusion scan. There is no evidence of myocardial ischemia or infarction.    There is trivial apical thinning which is a normal variant.    The gated perfusion images showed an ejection fraction of 44% at rest.    The EKG portion of this study is negative for ischemia.    The patient reported no chest pain during the stress test.    During stress, occasional PVCs are noted.    No valid procedures specified.    PHYSICAL EXAM  CONSTITUTIONAL: Well built, well nourished in no apparent distress  NECK: no carotid bruit, no JVD  LUNGS: CTA  CHEST WALL: no tenderness,  HEART: regular rate and rhythm, S1, S2 normal, no murmur, click, rub or gallop   ABDOMEN: soft, non-tender; bowel sounds normal; no masses,  no organomegaly  EXTREMITIES: Extremities normal, no edema, no calf tenderness noted  NEURO: AAO X 3    I HAVE REVIEWED :    The vital signs, nurses notes, and all the pertinent radiology and labs.         Current Outpatient Medications   Medication Instructions    albuterol (PROVENTIL) 2.5 mg, Nebulization, Every 6 hours PRN, Rescue    albuterol (PROVENTIL/VENTOLIN HFA) 90 mcg/actuation inhaler INHALE 2 PUFFS INTO THE LUNGS EVERY 6 HOURS AS NEEDED FOR WHEEZING. RESCUE    ammonium lactate 12 % Crea 1 application, Topical, 3 times daily, Apply to affected area    apixaban (ELIQUIS) 5 mg, Oral, 2 times daily    aspirin (ECOTRIN) 81 mg, Oral, Daily    clobetasoL (TEMOVATE) 0.05 % external solution APPLY 1 APPLICATION TOPICALLY TO  AFFECTED AREAS ON SCALP TWICE DAILY AS NEEDED FOR FLARES    clobetasoL (TEMOVATE) 0.05 % external solution clobetasol 0.05 % scalp solution   APPLY 1 APPLICATION TOPICALLY TO AFFECTED AREAS ON SCALP TWICE DAILY AS NEEDED FOR FLARES    clotrimazole-betamethasone 1-0.05% (LOTRISONE) cream APPLY 1 APPLICATION TO AFFECTED AREAS TOPICALLY ON SCALP ONCE A NIGHT AT BEDTIME FOR FLARES    diltiaZEM (CARDIZEM CD) 120 mg, Oral, Daily    finasteride (PROPECIA) 1 mg tablet finasteride 1 mg tablet   Take 1 tablet every day by oral route.    hydrALAZINE (APRESOLINE) 25 mg, Oral, 3 times daily    ketoconazole (NIZORAL) 2 % shampoo APPLY 1 APPLICATION TOPICALLY TO SCALP LATHER AND RINSE ONCE DAILY    levalbuterol (XOPENEX) 0.63 mg, Nebulization, Every 6 hours PRN, Rescue    moxifloxacin (VIGAMOX) 0.5 % ophthalmic solution Both Eyes    ondansetron (ZOFRAN-ODT) 4 mg, Oral, Every 8 hours PRN    prednisoLONE acetate (PRED FORTE) 1 % DrpS 1 drop, Both Eyes, 4 times daily    PROLENSA 0.07 % Drop 1 drop, Daily    sertraline (ZOLOFT) 25 mg, Oral, Daily    STIOLTO RESPIMAT 2.5-2.5 mcg/actuation Mist 1 puff, Inhalation, Daily    tamsulosin (FLOMAX) 0.4 mg Cap 1 capsule, Oral, Daily    traZODone (DESYREL) 50 MG tablet TAKE 0.5 TABLETS EVERY DAY BY MOUTH AT BEDTIME    TRELEGY ELLIPTA 100-62.5-25 mcg DsDv 1 puff, Oral, Daily    triamcinolone acetonide 0.1% (KENALOG) 0.1 % ointment Topical (Top), 2 times daily          Assessment:   Pulmonary fibrosis, chronic on O2  PAF on Eliquis  Negative noninvasive cardiac assessment 12/21 with EF 45%.  Recent chest x-ray with extensive diffuse interstitial fibrotic changes, bolus changes, no interval change.      Plan:   Paroxysmal atrial fibrillation, no recent Holter monitor.  On oral anticoagulation, continue same medications.  Suggest follow-up with 48 hour Holter monitor, call results.  Six months.          No follow-ups on file.

## 2022-09-27 NOTE — TELEPHONE ENCOUNTER
----- Message from Traci Underwood sent at 9/27/2022 11:50 AM CDT -----  Contact: Yessi cruz/ [facility]  Type: Needs Medical Advice    Who Called:  Yessi    Symptoms (please be specific):  Red spots where heart monitor electrodes were placed, burning    Pharmacy name and phone #:    [facility] #[id number] - [place] [id number] AT [facility] [address]  [address] [place] [id number]  Phone: [phone] Fax: [phone]    Best Call Back Number: [phone]    Additional Information: Please call back.  Thanks.

## 2022-09-27 NOTE — TELEPHONE ENCOUNTER
Spoke to emily informed her that the holter was for 48 hours from 9/22/22. She states that he does have red spots and that I might be an allergic reaction . I told her tto call pcp . Please advise

## 2022-10-27 PROBLEM — Z86.19 HEPATITIS C VIRUS INFECTION CURED AFTER ANTIVIRAL DRUG THERAPY: Status: ACTIVE | Noted: 2022-01-01

## 2022-10-27 PROBLEM — B18.2 CHRONIC HEPATITIS C: Status: RESOLVED | Noted: 2022-02-28 | Resolved: 2022-01-01

## 2022-10-28 NOTE — TELEPHONE ENCOUNTER
I spoke with patient and msg from PA Scheuermann relayed. He asked that visit with MACO Hernandez be scheduled in person on 12/22/22.  Quant scheduled 4/26/23; appt reminder notice mailed.

## 2022-10-28 NOTE — TELEPHONE ENCOUNTER
Pt began 12 weeks of Epclusa on 5/12/22  Anticipated treatment end date: 8/2022  Celeste ?  Treatment naive  F2      10/26/22  Alk Phos 330  TBili, AST, ALT normal  HCV neg     These labs document SVR12 following successful HCV treatment with Epclusa    please tell patient:  1.) Lab test shows there is NO Hepatitis C in the blood. This means the Hepatitis C is cured!!  We do not expect the virus to return.  This does not give protection from Hepatitis C and patient could be infected again if ever exposed to the virus again.    2.) Labs show one of liver enzymes called alkaline phosphatase is elevated. Sometimes this can be elevated due to a liver problem but sometimes other conditions can cause it to be elevated. He needs additional testing to evaluate.       Please schedule   - Visit w/ ORION in Hepatology for elevated alk phos  - HCV RNA in 6 months

## 2022-11-01 NOTE — PROCEDURES
Large Joint Aspiration/Injection: L subacromial bursa    Date/Time: 11/1/2022 3:20 PM  Performed by: MINERVA Aparicio  Authorized by: MINERVA Aparicio     Consent Done?:  Yes (Verbal)  Indications:  Pain  Timeout: prior to procedure the correct patient, procedure, and site was verified    Prep: patient was prepped and draped in usual sterile fashion      Local anesthesia used?: Yes    Local anesthetic:  Lidocaine 1% without epinephrine  Anesthetic total (ml):  5      Details:  Needle Size:  21 G  Ultrasonic Guidance for needle placement?: No    Approach:  Posterior  Location:  Shoulder  Site:  L subacromial bursa  Medications:  40 mg triamcinolone acetonide 40 mg/mL  Patient tolerance:  Patient tolerated the procedure well with no immediate complications

## 2022-11-01 NOTE — PROGRESS NOTES
Chief Complaint   Patient presents with    Right Shoulder - Pain       HPI:    This is a 67 y.o. who presents today complaining of left shoulder pain for several years after no known injury or trauma. Pain is aching and is having trouble sleeping at night related to pain. Also complaints of decreased ROM. No numbness or tingling. No associated signs or symptoms.      Past Medical History:   Diagnosis Date    Dysrhythmia     Hepatitis C virus infection cured after antiviral drug therapy     treated / cured (SVR12 - 10/2022) (F2)    Medical non-compliance       History reviewed. No pertinent surgical history.   Current Outpatient Medications on File Prior to Visit   Medication Sig Dispense Refill    albuterol (PROVENTIL) 2.5 mg /3 mL (0.083 %) nebulizer solution Take 3 mLs (2.5 mg total) by nebulization every 6 (six) hours as needed for Wheezing. Rescue 240 each 1    albuterol (PROVENTIL/VENTOLIN HFA) 90 mcg/actuation inhaler Inhale 2 puffs into the lungs every 6 (six) hours as needed for Wheezing. Rescue 18 g 2    ammonium lactate 12 % Crea Apply 1 application topically 3 (three) times daily. Apply to affected area      apixaban (ELIQUIS) 5 mg Tab Take 1 tablet (5 mg total) by mouth 2 (two) times daily. 60 tablet 1    aspirin (ECOTRIN) 81 MG EC tablet Take 1 tablet (81 mg total) by mouth once daily. 301 tablet 1    clobetasoL (TEMOVATE) 0.05 % external solution APPLY 1 APPLICATION TOPICALLY TO AFFECTED AREAS ON SCALP TWICE DAILY AS NEEDED FOR FLARES      clobetasoL (TEMOVATE) 0.05 % external solution clobetasol 0.05 % scalp solution   APPLY 1 APPLICATION TOPICALLY TO AFFECTED AREAS ON SCALP TWICE DAILY AS NEEDED FOR FLARES      clotrimazole-betamethasone 1-0.05% (LOTRISONE) cream APPLY 1 APPLICATION TO AFFECTED AREAS TOPICALLY ON SCALP ONCE A NIGHT AT BEDTIME FOR FLARES      diltiaZEM (CARDIZEM CD) 120 MG Cp24 Take 1 capsule (120 mg total) by mouth once daily. 30 capsule 1    finasteride (PROPECIA) 1 mg tablet  finasteride 1 mg tablet   Take 1 tablet every day by oral route.      hydrALAZINE (APRESOLINE) 25 MG tablet Take 1 tablet (25 mg total) by mouth 3 (three) times daily. 90 tablet 11    ketoconazole (NIZORAL) 2 % shampoo APPLY 1 APPLICATION TOPICALLY TO SCALP LATHER AND RINSE ONCE DAILY      levalbuterol (XOPENEX) 0.63 mg/3 mL nebulizer solution Take 3 mLs (0.63 mg total) by nebulization every 6 (six) hours as needed for Wheezing. Rescue (Patient not taking: Reported on 9/15/2022) 3 mL 1    ondansetron (ZOFRAN-ODT) 4 MG TbDL Take 1 tablet (4 mg total) by mouth every 8 (eight) hours as needed (nausea). 20 tablet 0    sertraline (ZOLOFT) 25 MG tablet Take 25 mg by mouth once daily.      STIOLTO RESPIMAT 2.5-2.5 mcg/actuation Mist Inhale 1 puff into the lungs once daily.      tamsulosin (FLOMAX) 0.4 mg Cap Take 1 capsule by mouth once daily.      traZODone (DESYREL) 50 MG tablet TAKE 0.5 TABLETS EVERY DAY BY MOUTH AT BEDTIME      TRELEGY ELLIPTA 100-62.5-25 mcg DsDv Take 1 puff by mouth once daily.      triamcinolone acetonide 0.1% (KENALOG) 0.1 % ointment Apply topically 2 (two) times daily.       No current facility-administered medications on file prior to visit.      Review of patient's allergies indicates:  No Known Allergies   Family History not pertinent   Social History     Socioeconomic History    Marital status:    Tobacco Use    Smoking status: Former     Packs/day: 1.50     Years: 30.00     Pack years: 45.00     Types: Cigarettes     Quit date: 2021     Years since quittin.8    Smokeless tobacco: Never    Tobacco comments:     used to smoke 2 ppd now down to 1 ppd   Substance and Sexual Activity    Alcohol use: Yes     Comment: once a week    Drug use: Never         Review of Systems:   Constitutional:  Denies fever or chills    Eyes:  Denies change in visual acuity    HENT:  Denies nasal congestion or sore throat    Respiratory:  Denies cough or shortness of breath    Cardiovascular:   Denies chest pain or edema    GI:  Denies abdominal pain, nausea, vomiting, bloody stools or diarrhea    :  Denies dysuria    Integument:  Denies rash    Neurologic:  Denies headache, focal weakness or sensory changes    Endocrine:  Denies polyuria or polydipsia    Lymphatic:  Denies swollen glands    Psychiatric:  Denies depression or anxiety       Physical Exam:    Constitutional:  Well developed, well nourished, no acute distress, non-toxic appearance    Integument:  Well hydrated, no rash    Lymphatic:  No lymphadenopathy noted    Neurologic:  Alert & oriented x 3,     Psychiatric:  Speech and behavior appropriate      Bilateral Shoulder Exam    Right Shoulder Exam   Shoulder exam performed same as contralateral side and is normal.    Left Shoulder Exam   Tenderness   Shoulder tenderness location: diffusely about shoulder.    Range of Motion   Forward Flexion: abnormal   External Rotation: abnormal     Muscle Strength   Supraspinatus: 4/5     Tests   Hawkin's test: positive  Impingement: positive    Other   Erythema: absent  Sensation: normal  Pulse: present     X-rays were performed today, personally reviewed by me and findings discussed with the patient.   3 views of the left shoulder show ac degenerative joint hypertrophy.               Impingement syndrome of left shoulder  -     Large Joint Aspiration/Injection: L subacromial bursa        Using an aseptic technique, I injected 5 cc of lidocaine 1% without and 1 cc of kenalog 40mg into the left shoulder. The patient tolerated this well. I will have them return to clinic in 6 weeks.            Answers submitted by the patient for this visit:  Orthopedics Questionnaire (Submitted on 10/31/2022)  unexpected weight change: No  appetite change : No  sleep disturbance: No  IMMUNOCOMPROMISED: No  nervous/ anxious: No  dysphoric mood: No  rash: No  visual disturbance: No  eye redness: No  eye pain: No  ear pain: No  tinnitus: No  hearing loss: No  sinus pressure :  No  nosebleeds: No  enviro allergies: No  food allergies: No  cough: No  shortness of breath: No  sweating: No  frequency: No  difficulty urinating: No  hematuria: No  chest pain: No  palpitations: No  nausea: No  vomiting: No  diarrhea: No  blood in stool: No  constipation: No  headaches: No  dizziness: No  numbness: No  seizures: No  joint swelling: No  myalgia: No  weakness: No  back pain: No   (Submitted on 10/31/2022)  Chief Complaint: Arm pain  Pain Chronicity: recurrent  History of trauma: No  Onset: more than 1 year ago  Frequency: daily  Progression since onset: waxing and waning  Injury mechanism: reaching  injury location: at home  pain- numeric: 6/10  pain location: left shoulder  pain quality: generalized  Radiating Pain: No  Aggravating factors: extension  fever: No  inability to bear weight: Yes  itching: No  joint locking: No  limited range of motion: Yes  stiffness: No  tingling: No  Treatments tried: nothing  physical therapy: not tried  Improvement on treatment: no relief

## 2022-12-05 PROBLEM — R09.02 HYPOXEMIA: Status: ACTIVE | Noted: 2022-01-01

## 2022-12-22 PROBLEM — R74.8 ELEVATED ALKALINE PHOSPHATASE LEVEL: Status: ACTIVE | Noted: 2022-01-01

## 2022-12-22 PROBLEM — R65.20 SEVERE SEPSIS: Status: RESOLVED | Noted: 2021-12-15 | Resolved: 2022-01-01

## 2022-12-22 PROBLEM — A41.9 SEVERE SEPSIS: Status: RESOLVED | Noted: 2021-12-15 | Resolved: 2022-01-01

## 2022-12-22 NOTE — PROGRESS NOTES
[facility] - New Patient     REFERRING PROVIDER: No ref. provider found  PCP: Primary Doctor No    Chief Complaint: Elevated alk phos       HISTORY     This is a 67 y.o. male referred for evaluation of elevated alk phos.     He has a history of hepatitis C s/p treatment with SVR/cure 10/2022.    Fibroscan 3/25/22 = F2 (kPa 7.8), <S1     No other known history of liver disease.    Review of labs:  - Transaminases: intermittent elevations to AST though now normal  - Alk phos: elevated since 12/2021 and trending up, 330   - Synthetic liver function: albumin low, otherwise WNL  - Platelets: WNL    Workup thus far:  Serologies:   Lab Results   Component Value Date    ANASCREEN Detected (A) 12/16/2021    HEPBSAG Negative 03/25/2022    HEPCAB High positive (A) 12/18/2021   MAURI +1:2560     Abd US 2/21/22 with normal liver and bile ducts    No metabolic risk factors for fatty liver.  Alcohol: once per week     Family history:  No family history of liver disease.    Meds:  -Rx: none concerning from liver standpoint  -OTC, herbs/supplements: n/a  No recent medication changes.     Denies recent illness or infection.    I note his h/o psoriasis. No other autoimmune conditions.     He has pulmonary fibrosis and emphysema, on supplemental oxygen. Main concern has been ongoing SOB. Appetite is poor and he has been losing weight.     No symptoms of hepatic decompensation including jaundice, ascites, cognitive problems to suggest hepatic encephalopathy, or GI bleeding.              Past medical history, surgical history, problem list, family history, social history, allergies: Reviewed and updated in the appropriate section of the electronic medical record.      Current Outpatient Medications   Medication Sig Dispense Refill    albuterol (PROVENTIL) 2.5 mg /3 mL (0.083 %) nebulizer solution Take 3 mLs (2.5 mg total) by nebulization every 6 (six) hours as needed for Wheezing. Rescue 240 each 1    albuterol  (PROVENTIL/VENTOLIN HFA) 90 mcg/actuation inhaler Inhale 2 puffs into the lungs every 6 (six) hours as needed for Wheezing. Rescue 18 g 2    ammonium lactate 12 % Crea Apply 1 application topically 3 (three) times daily. Apply to affected area      apixaban (ELIQUIS) 5 mg Tab Take 1 tablet (5 mg total) by mouth 2 (two) times daily. 60 tablet 1    aspirin (ECOTRIN) 81 MG EC tablet Take 1 tablet (81 mg total) by mouth once daily. 301 tablet 1    benzonatate (TESSALON) 200 MG capsule Take 1 capsule (200 mg total) by mouth 3 (three) times daily as needed for Cough. 21 capsule 0    clobetasoL (TEMOVATE) 0.05 % external solution APPLY 1 APPLICATION TOPICALLY TO AFFECTED AREAS ON SCALP TWICE DAILY AS NEEDED FOR FLARES      clobetasoL (TEMOVATE) 0.05 % external solution clobetasol 0.05 % scalp solution   APPLY 1 APPLICATION TOPICALLY TO AFFECTED AREAS ON SCALP TWICE DAILY AS NEEDED FOR FLARES      clotrimazole-betamethasone 1-0.05% (LOTRISONE) cream APPLY 1 APPLICATION TO AFFECTED AREAS TOPICALLY ON SCALP ONCE A NIGHT AT BEDTIME FOR FLARES      diltiaZEM (CARDIZEM CD) 120 MG Cp24 Take 1 capsule (120 mg total) by mouth once daily. 30 capsule 1    finasteride (PROPECIA) 1 mg tablet finasteride 1 mg tablet   Take 1 tablet every day by oral route.      fluticasone propionate (FLONASE) 50 mcg/actuation nasal spray 1 spray by Each Nostril route once daily.      guaiFENesin (MUCINEX) 600 mg 12 hr tablet Take 1,200 mg by mouth 2 (two) times daily.      hydrALAZINE (APRESOLINE) 25 MG tablet Take 1 tablet (25 mg total) by mouth 3 (three) times daily. 90 tablet 11    ketoconazole (NIZORAL) 2 % shampoo APPLY 1 APPLICATION TOPICALLY TO SCALP LATHER AND RINSE ONCE DAILY      levalbuterol (XOPENEX) 0.63 mg/3 mL nebulizer solution Take 3 mLs (0.63 mg total) by nebulization every 6 (six) hours as needed for Wheezing. Rescue (Patient not taking: Reported on 9/15/2022) 3 mL 1    levoFLOXacin (LEVAQUIN) 500 MG tablet Levaquin 500 mg tablet    Take 1 tablet every 24 hours by oral route for 5 days.      megestroL (MEGACE ES) 625 mg/5 mL (125 mg/mL) Susp 5 mLs.      ondansetron (ZOFRAN-ODT) 4 MG TbDL Take 1 tablet (4 mg total) by mouth every 8 (eight) hours as needed (nausea). 20 tablet 0    sertraline (ZOLOFT) 25 MG tablet Take 25 mg by mouth once daily.      tamsulosin (FLOMAX) 0.4 mg Cap Take 1 capsule by mouth once daily.      traZODone (DESYREL) 50 MG tablet TAKE 0.5 TABLETS EVERY DAY BY MOUTH AT BEDTIME      TRELEGY ELLIPTA 100-62.5-25 mcg DsDv Take 1 puff by mouth once daily.      triamcinolone acetonide 0.1% (KENALOG) 0.1 % ointment Apply topically 2 (two) times daily.      zinc oxide-white petrolatum 10-78 % Crea [place] Moist Barrier-Zinc 10 %-78 % topical cream   apply to affected area 2 x day       No current facility-administered medications for this visit.     Medication list reviewed and updated.      Review of Systems   Constitutional: +fatigue, weight loss   Respiratory: +shortness of breath    Cardiovascular: Negative for leg swelling.  Gastrointestinal: Negative for abdominal distention, abdominal pain, diarrhea, nausea, and vomiting. Negative for blood in stool, melena, or hematemesis.  Musculoskeletal: Negative for myalgias.    Skin: Negative for itching.  Neurological: Negative for dizziness or tremors. Negative for confusion, slowed mentation, or memory loss.   Hematological: Does not bruise/bleed easily.   Psychiatric: Negative for mood changes or sleep disturbance.      Physical Exam   Constitutional: Thin. No distress. Alert and oriented.  Eyes: No scleral icterus.   Pulmonary/Chest: Respiratory effort normal. No respiratory distress. On oxygen.  Abdominal: No distension, no ascites appreciated.   Extremities: No edema.   Neurological: No tremor or asterixis. Gait normal.  Skin: No jaundice. No spider telangiectasias or palmar erythema.  Psychiatric: Normal mood and affect. Speech, behavior, and thought content normal. No  "depression or anxiety noted.       Vitals reviewed.  /76 (BP Location: Right arm, Patient Position: Sitting, BP Method: Medium (Automatic))   Pulse (!) 120   Resp 17   Ht 5' 5" (1.651 m)   Wt 49.7 kg (109 lb 9.1 oz)   BMI 18.23 kg/m²         LABS & DIAGNOSTIC STUDIES     I have personally reviewed pertinent laboratory findings:    Lab Results   Component Value Date    ALT 22 10/26/2022    AST 30 10/26/2022    ALKPHOS 330 (H) 10/26/2022    BILITOT 0.3 10/26/2022    ALBUMIN 2.9 (L) 10/26/2022    INR 1.1 03/25/2022       Lab Results   Component Value Date    WBC 11.42 03/25/2022    HGB 12.6 (L) 03/25/2022    HCT 42.1 03/25/2022     (H) 03/25/2022     03/25/2022       Lab Results   Component Value Date     (L) 03/25/2022    K 4.2 03/25/2022    BUN 14 03/25/2022    CREATININE 0.5 03/25/2022    ESTGFRAFRICA >60.0 03/25/2022    EGFRNONAA >60.0 03/25/2022       Lab Results   Component Value Date    ANASCREEN Detected (A) 12/16/2021    HEPBSAG Negative 03/25/2022    HEPBCAB Negative 03/25/2022    HEPCAB High positive (A) 12/18/2021    [id number] Negative 12/16/2021       No results found for: AFP    I have personally reviewed the following result reports:  Abdominal US - 2/21/22        ASSESSMENT & PLAN     67 y.o. male with:    1. Elevated alk phos  -- Etiology unclear at this time  -- Check GGT and isoenzymes to confirm liver vs other source  -- Start serologic workup including AMA to assess for PBC   -- Normal liver US earlier this year though pending labs, will likely plan to update imaging. May need MRCP.      2. History of hepatitis C  -- s/p treatment with SVR/cure  -- Repeat HCV RNA already scheduled in April   -- Fibroscan 3/2022 = F2      Orders Placed This Encounter   Procedures    Hepatic Function Panel    Antimitochondrial Antibody    Alkaline Phosphatase, Isoenzymes    Gamma GT    IgM    Angiotensin Converting Enzyme    Ferritin    Iron and TIBC       *See AVS for patient education " and instructions.       Return to clinic pending above results.       Thank you for allowing me to participate in the care of Magdy Shiela Hernandez, BARBYP-C  Hepatology        Duration of encounter: 45 min  This includes face to face time and non-face to face time preparing to see the patient (eg, review of tests), obtaining and/or reviewing separately obtained history, documenting clinical information in the electronic or other health record, independently interpreting results and communicating results to the patient/family/caregiver, or Care coordination.

## 2023-01-01 ENCOUNTER — PATIENT MESSAGE (OUTPATIENT)
Dept: HEPATOLOGY | Facility: CLINIC | Age: 68
End: 2023-01-01
Payer: MEDICARE

## 2023-01-01 ENCOUNTER — TELEPHONE (OUTPATIENT)
Dept: HEPATOLOGY | Facility: CLINIC | Age: 68
End: 2023-01-01
Payer: MEDICARE

## 2023-01-01 ENCOUNTER — OFFICE VISIT (OUTPATIENT)
Dept: CARDIOLOGY | Facility: CLINIC | Age: 68
End: 2023-01-01
Payer: MEDICARE

## 2023-01-01 ENCOUNTER — HOSPITAL ENCOUNTER (OUTPATIENT)
Dept: RADIOLOGY | Facility: HOSPITAL | Age: 68
Discharge: HOME | End: 2023-01-19
Attending: NURSE PRACTITIONER
Payer: MEDICARE

## 2023-01-01 ENCOUNTER — LAB VISIT (OUTPATIENT)
Dept: LAB | Facility: HOSPITAL | Age: 68
End: 2023-01-01
Attending: PHYSICIAN ASSISTANT
Payer: MEDICARE

## 2023-01-01 VITALS
SYSTOLIC BLOOD PRESSURE: 106 MMHG | BODY MASS INDEX: 18.26 KG/M2 | WEIGHT: 109.56 LBS | DIASTOLIC BLOOD PRESSURE: 72 MMHG | HEART RATE: 96 BPM | HEIGHT: 65 IN

## 2023-01-01 DIAGNOSIS — J84.10 PULMONARY FIBROSIS: ICD-10-CM

## 2023-01-01 DIAGNOSIS — R06.02 SHORTNESS OF BREATH: ICD-10-CM

## 2023-01-01 DIAGNOSIS — Z72.0 TOBACCO ABUSE: ICD-10-CM

## 2023-01-01 DIAGNOSIS — R79.89 ELEVATED TROPONIN: ICD-10-CM

## 2023-01-01 DIAGNOSIS — R74.8 ELEVATED ALKALINE PHOSPHATASE LEVEL: Primary | ICD-10-CM

## 2023-01-01 DIAGNOSIS — R79.89 ELEVATED BRAIN NATRIURETIC PEPTIDE (BNP) LEVEL: ICD-10-CM

## 2023-01-01 DIAGNOSIS — R93.89 ABNORMAL CT OF THE CHEST: ICD-10-CM

## 2023-01-01 DIAGNOSIS — R74.8 ELEVATED ALKALINE PHOSPHATASE LEVEL: ICD-10-CM

## 2023-01-01 DIAGNOSIS — I48.91 ATRIAL FIBRILLATION WITH RAPID VENTRICULAR RESPONSE: Primary | ICD-10-CM

## 2023-01-01 DIAGNOSIS — B18.2 CHRONIC HEPATITIS C WITHOUT HEPATIC COMA: ICD-10-CM

## 2023-01-01 LAB
ALBUMIN SERPL BCP-MCNC: 2.7 G/DL (ref 3.5–5.2)
ALP SERPL-CCNC: 234 U/L (ref 55–135)
ALT SERPL W/O P-5'-P-CCNC: 10 U/L (ref 10–44)
AST SERPL-CCNC: 22 U/L (ref 10–40)
BILIRUB DIRECT SERPL-MCNC: 0.2 MG/DL (ref 0.1–0.3)
BILIRUB SERPL-MCNC: 0.4 MG/DL (ref 0.1–1)
CLINICAL BIOCHEMIST REVIEW: NORMAL
HCV RNA SERPL QL NAA+PROBE: NOT DETECTED
HCV RNA SPEC NAA+PROBE-ACNC: <12 IU/ML
PLPETH BLD-MCNC: <10 NG/ML
POPETH BLD-MCNC: <20 NG/ML
PROT SERPL-MCNC: 7.5 G/DL (ref 6–8.4)

## 2023-01-01 PROCEDURE — 80076 HEPATIC FUNCTION PANEL: CPT | Performed by: NURSE PRACTITIONER

## 2023-01-01 PROCEDURE — 76376 3D RENDER W/INTRP POSTPROCES: CPT | Mod: 26,,, | Performed by: RADIOLOGY

## 2023-01-01 PROCEDURE — 76376 3D RENDER W/INTRP POSTPROCES: CPT | Mod: TC,PO

## 2023-01-01 PROCEDURE — 3074F SYST BP LT 130 MM HG: CPT | Mod: CPTII,S$GLB,, | Performed by: INTERNAL MEDICINE

## 2023-01-01 PROCEDURE — 74181 MRI ABDOMEN W/O CONTRAST: CPT | Mod: 26,,, | Performed by: RADIOLOGY

## 2023-01-01 PROCEDURE — 1126F PR PAIN SEVERITY QUANTIFIED, NO PAIN PRESENT: ICD-10-PCS | Mod: CPTII,S$GLB,, | Performed by: INTERNAL MEDICINE

## 2023-01-01 PROCEDURE — 3078F PR MOST RECENT DIASTOLIC BLOOD PRESSURE < 80 MM HG: ICD-10-PCS | Mod: CPTII,S$GLB,, | Performed by: INTERNAL MEDICINE

## 2023-01-01 PROCEDURE — 74181 MRI ABDOMEN WITHOUT CONTRAST MRCP: ICD-10-PCS | Mod: 26,,, | Performed by: RADIOLOGY

## 2023-01-01 PROCEDURE — 1126F AMNT PAIN NOTED NONE PRSNT: CPT | Mod: CPTII,S$GLB,, | Performed by: INTERNAL MEDICINE

## 2023-01-01 PROCEDURE — 3288F FALL RISK ASSESSMENT DOCD: CPT | Mod: CPTII,S$GLB,, | Performed by: INTERNAL MEDICINE

## 2023-01-01 PROCEDURE — 76376 MRI ABDOMEN WITHOUT CONTRAST MRCP: ICD-10-PCS | Mod: 26,,, | Performed by: RADIOLOGY

## 2023-01-01 PROCEDURE — 3078F DIAST BP <80 MM HG: CPT | Mod: CPTII,S$GLB,, | Performed by: INTERNAL MEDICINE

## 2023-01-01 PROCEDURE — 3288F PR FALLS RISK ASSESSMENT DOCUMENTED: ICD-10-PCS | Mod: CPTII,S$GLB,, | Performed by: INTERNAL MEDICINE

## 2023-01-01 PROCEDURE — 3008F PR BODY MASS INDEX (BMI) DOCUMENTED: ICD-10-PCS | Mod: CPTII,S$GLB,, | Performed by: INTERNAL MEDICINE

## 2023-01-01 PROCEDURE — 99999 PR PBB SHADOW E&M-EST. PATIENT-LVL IV: CPT | Mod: PBBFAC,,, | Performed by: INTERNAL MEDICINE

## 2023-01-01 PROCEDURE — 99214 PR OFFICE/OUTPT VISIT, EST, LEVL IV, 30-39 MIN: ICD-10-PCS | Mod: S$GLB,,, | Performed by: INTERNAL MEDICINE

## 2023-01-01 PROCEDURE — 99999 PR PBB SHADOW E&M-EST. PATIENT-LVL IV: ICD-10-PCS | Mod: PBBFAC,,, | Performed by: INTERNAL MEDICINE

## 2023-01-01 PROCEDURE — 3008F BODY MASS INDEX DOCD: CPT | Mod: CPTII,S$GLB,, | Performed by: INTERNAL MEDICINE

## 2023-01-01 PROCEDURE — 1101F PT FALLS ASSESS-DOCD LE1/YR: CPT | Mod: CPTII,S$GLB,, | Performed by: INTERNAL MEDICINE

## 2023-01-01 PROCEDURE — 99214 OFFICE O/P EST MOD 30 MIN: CPT | Mod: S$GLB,,, | Performed by: INTERNAL MEDICINE

## 2023-01-01 PROCEDURE — 87522 HEPATITIS C REVRS TRNSCRPJ: CPT | Performed by: PHYSICIAN ASSISTANT

## 2023-01-01 PROCEDURE — 3074F PR MOST RECENT SYSTOLIC BLOOD PRESSURE < 130 MM HG: ICD-10-PCS | Mod: CPTII,S$GLB,, | Performed by: INTERNAL MEDICINE

## 2023-01-01 PROCEDURE — 74181 MRI ABDOMEN W/O CONTRAST: CPT | Mod: TC,PO

## 2023-01-01 PROCEDURE — 80321 ALCOHOLS BIOMARKERS 1OR 2: CPT | Performed by: NURSE PRACTITIONER

## 2023-01-01 PROCEDURE — 1101F PR PT FALLS ASSESS DOC 0-1 FALLS W/OUT INJ PAST YR: ICD-10-PCS | Mod: CPTII,S$GLB,, | Performed by: INTERNAL MEDICINE

## 2023-01-01 RX ORDER — TAMSULOSIN HYDROCHLORIDE 0.4 MG/1
0.8 CAPSULE ORAL NIGHTLY
COMMUNITY

## 2023-02-20 NOTE — PROGRESS NOTES
Subjective:    Patient ID:  Magdy Austin is a 67 y.o. male patient here for evaluation Follow-up      History of Present Illness:  Cardiology follow-up.  Known paroxysmal atrial fibrillation the setting of pulmonary fibrosis.  Quit smoking 2021.  Noninvasive cardiac assessment in the past has included dobutamine stress echo which was negative but was documented at 45%.    No ongoing chest pain stable dyspnea.  No PND orthopnea.  No edema.  History of CVA Ca.             Review of patient's allergies indicates:  No Known Allergies    Past Medical History:   Diagnosis Date    Dysrhythmia     Hepatitis C virus infection cured after antiviral drug therapy     treated / cured (SVR12 - 10/2022) (F2)    Medical non-compliance      History reviewed. No pertinent surgical history.  Social History     Tobacco Use    Smoking status: Former     Packs/day: 1.50     Years: 30.00     Pack years: 45.00     Types: Cigarettes     Quit date: 2021     Years since quittin.1    Smokeless tobacco: Never    Tobacco comments:     used to smoke 2 ppd now down to 1 ppd   Substance Use Topics    Alcohol use: Yes    Drug use: Never        Review of Systems:    As noted in HPI in addition      REVIEW OF SYSTEMS  Review of Systems   Constitutional: Negative for decreased appetite, diaphoresis, night sweats, weight gain and weight loss.   HENT:  Negative for nosebleeds and odynophagia.    Eyes:  Negative for double vision and photophobia.   Cardiovascular:  Negative for chest pain, claudication, cyanosis, dyspnea on exertion, irregular heartbeat, leg swelling, near-syncope, orthopnea, palpitations, paroxysmal nocturnal dyspnea and syncope.   Respiratory:  Negative for cough, hemoptysis, shortness of breath and wheezing.    Hematologic/Lymphatic: Negative for adenopathy.   Skin:  Negative for flushing, skin cancer and suspicious lesions.   Musculoskeletal:  Negative for gout, myalgias and neck pain.    Gastrointestinal:  Negative for abdominal pain, heartburn, hematemesis and hematochezia.   Genitourinary:  Negative for bladder incontinence, hesitancy and nocturia.   Neurological:  Negative for focal weakness, headaches, light-headedness and paresthesias.   Psychiatric/Behavioral:  Negative for memory loss and substance abuse.             Objective:        Vitals:    02/20/23 1313   BP: 106/72   Pulse: 96       Lab Results   Component Value Date    WBC 11.42 03/25/2022    HGB 12.6 (L) 03/25/2022    HCT 42.1 03/25/2022     03/25/2022    CHOL 71 (L) 12/16/2021    TRIG 115 12/16/2021    HDL 14 (L) 12/16/2021    ALT 16 12/22/2022    AST 26 12/22/2022     (L) 03/25/2022    K 4.2 03/25/2022    CL 98 03/25/2022    CREATININE 0.5 03/25/2022    BUN 14 03/25/2022    CO2 26 03/25/2022    TSH 0.654 12/16/2021    INR 1.1 03/25/2022    HGBA1C 5.5 12/16/2021        ECHOCARDIOGRAM RESULTS  Results for orders placed during the hospital encounter of 12/15/21    Echo    Interpretation Summary  · Mild left atrial enlargement.  · The left ventricle is normal in size with mildly decreased systolic function.  · Grade I left ventricular diastolic dysfunction.  · The estimated PA systolic pressure is 14 mmHg.  · Normal central venous pressure (3 mmHg).  · The estimated ejection fraction is 45%.  · There is abnormal septal wall motion.  · Mild right atrial enlargement.  · Mild mitral regurgitation.  · Mild tricuspid regurgitation.        CURRENT/PREVIOUS VISIT EKG  Results for orders placed or performed in visit on 04/22/22   IN OFFICE EKG 12-LEAD (to [place])    Collection Time: 04/22/22 12:21 PM    Narrative    Test Reason : Z00.00    Vent. Rate : 077 BPM     Atrial Rate : 077 BPM     P-R Int : 144 ms          QRS Dur : 076 ms      QT Int : 406 ms       P-R-T Axes : 085 -51 033 degrees     QTc Int : 459 ms    Sinus rhythm with Premature supraventricular complexes  Left anterior fascicular block  Abnormal ECG  When compared  with ECG of 15-DEC-2021 20:35,  Premature supraventricular complexes are now Present  Left anterior fascicular block is now Present  T wave inversion no longer evident in Anterior leads  Confirmed by DANN CANTOR MD ([id number]) on 4/25/2022 8:48:18 AM    Referred By: MAYNOR CHINO           Confirmed By:DANN CANTOR MD     No valid procedures specified.   Results for orders placed during the hospital encounter of 12/15/21    Nuclear Stress - Cardiology Interpreted    Interpretation Summary    Normal myocardial perfusion scan. There is no evidence of myocardial ischemia or infarction.    There is trivial apical thinning which is a normal variant.    The gated perfusion images showed an ejection fraction of 44% at rest.    The EKG portion of this study is negative for ischemia.    The patient reported no chest pain during the stress test.    During stress, occasional PVCs are noted.    No valid procedures specified.    PHYSICAL EXAM  CONSTITUTIONAL: Well built, well nourished in no apparent distress  NECK: no carotid bruit, no JVD  LUNGS:  Decreased breath sounds scattered dry rales.  CHEST WALL: no tenderness,  HEART: regular rate and rhythm, S1, S2 normal, no murmur, click, rub or gallop   ABDOMEN: soft, non-tender; bowel sounds normal; no masses,  no organomegaly  EXTREMITIES: Extremities normal, no edema, no calf tenderness noted  NEURO: AAO X 3    I HAVE REVIEWED :    The vital signs, nurses notes, and all the pertinent radiology and labs.         Current Outpatient Medications   Medication Instructions    albuterol (PROVENTIL) 2.5 mg, Nebulization, Every 6 hours PRN, Rescue    albuterol (PROVENTIL/VENTOLIN HFA) 90 mcg/actuation inhaler 2 puffs, Inhalation, Every 6 hours PRN, Rescue    ammonium lactate 12 % Crea 1 application, Topical, 3 times daily, Apply to affected area    apixaban (ELIQUIS) 5 mg, Oral, 2 times daily    aspirin (ECOTRIN) 81 mg, Oral, Daily    benzonatate (TESSALON) 200 mg, Oral, 3  times daily PRN    clobetasoL (TEMOVATE) 0.05 % external solution APPLY 1 APPLICATION TOPICALLY TO AFFECTED AREAS ON SCALP TWICE DAILY AS NEEDED FOR FLARES    clobetasoL (TEMOVATE) 0.05 % external solution clobetasol 0.05 % scalp solution   APPLY 1 APPLICATION TOPICALLY TO AFFECTED AREAS ON SCALP TWICE DAILY AS NEEDED FOR FLARES    clotrimazole-betamethasone 1-0.05% (LOTRISONE) cream APPLY 1 APPLICATION TO AFFECTED AREAS TOPICALLY ON SCALP ONCE A NIGHT AT BEDTIME FOR FLARES    diltiaZEM (CARDIZEM CD) 120 mg, Oral, Daily    fluticasone propionate (FLONASE) 50 mcg/actuation nasal spray 1 spray, Each Nostril, Daily    guaiFENesin (MUCINEX) 1,200 mg, Oral, 2 times daily    hydrALAZINE (APRESOLINE) 25 mg, Oral, 3 times daily    ketoconazole (NIZORAL) 2 % shampoo APPLY 1 APPLICATION TOPICALLY TO SCALP LATHER AND RINSE ONCE DAILY    levoFLOXacin (LEVAQUIN) 500 MG tablet Levaquin 500 mg tablet   Take 1 tablet every 24 hours by oral route for 5 days.    megestroL (MEGACE ES) 625 mg/5 mL (125 mg/mL) Susp 5 mLs    ondansetron (ZOFRAN-ODT) 4 mg, Oral, Every 8 hours PRN    sertraline (ZOLOFT) 25 mg, Oral, Daily    tamsulosin (FLOMAX) 0.4 mg, Oral, 2 times daily    traZODone (DESYREL) 50 MG tablet TAKE 0.5 TABLETS EVERY DAY BY MOUTH AT BEDTIME    TRELEGY ELLIPTA 100-62.5-25 mcg DsDv 1 puff, Oral, Daily    triamcinolone acetonide 0.1% (KENALOG) 0.1 % ointment Topical (Top), 2 times daily    zinc oxide-white petrolatum 10-78 % Crea [place] Moist Barrier-Zinc 10 %-78 % topical cream   apply to affected area 2 x day          Assessment:   COPD, pulmonary fibrosis.  Past tobacco use.  Noninvasive cardiac assessment 2021 with negative dobutamine stress echo.  EF 45%.  Hypertension.  PAF on chronic oral anticoagulation.  Last echo with no recurrent PAF, creatinine PACs.  2021.  Remote history of chronic hep C.          Plan:   Continue Eliquis 5 b.i.d..  Plan per Pulmonary.  Continue diltiazem 120 daily.  Hydralazine  25 t.i.d..  Update echo, call results  Six months.      No follow-ups on file.

## 2023-03-29 PROBLEM — J43.9 PULMONARY EMPHYSEMA: Status: ACTIVE | Noted: 2023-01-01

## 2023-03-29 PROBLEM — J44.9 CHRONIC OBSTRUCTIVE PULMONARY DISEASE: Status: ACTIVE | Noted: 2023-01-01

## 2023-03-29 PROBLEM — J84.9 INTERSTITIAL PULMONARY DISEASE, UNSPECIFIED: Status: ACTIVE | Noted: 2023-01-01

## 2023-05-02 NOTE — TELEPHONE ENCOUNTER
----- Message from Jasmin Hernandez NP sent at 5/1/2023  4:38 PM CDT -----  Please schedule hepatic panel in ~6 months, any time prior to our next visit. Thanks.

## 2023-05-02 NOTE — TELEPHONE ENCOUNTER
Called patient to schedule lab test.  Patient confirmed and agreed with the schedule.  Reminder letter mailed.

## 2023-05-19 PROBLEM — I48.0 PAF (PAROXYSMAL ATRIAL FIBRILLATION): Status: ACTIVE | Noted: 2023-01-01

## 2023-05-21 PROBLEM — Z86.19 HISTORY OF MAI INFECTION: Status: ACTIVE | Noted: 2023-01-01

## 2023-05-23 PROBLEM — I25.10 CORONARY ARTERY DISEASE INVOLVING NATIVE CORONARY ARTERY OF NATIVE HEART WITHOUT ANGINA PECTORIS: Status: ACTIVE | Noted: 2023-01-01

## 2023-05-30 PROBLEM — I21.4 NSTEMI (NON-ST ELEVATED MYOCARDIAL INFARCTION): Status: ACTIVE | Noted: 2023-01-01

## 2023-05-31 PROBLEM — J96.21 ACUTE ON CHRONIC RESPIRATORY FAILURE WITH HYPOXIA AND HYPERCAPNIA: Status: ACTIVE | Noted: 2021-12-15

## 2023-05-31 PROBLEM — J96.22 ACUTE ON CHRONIC RESPIRATORY FAILURE WITH HYPOXIA AND HYPERCAPNIA: Status: ACTIVE | Noted: 2021-12-15

## 2023-06-01 PROBLEM — J96.01 ACUTE RESPIRATORY FAILURE WITH HYPOXIA: Status: ACTIVE | Noted: 2023-01-01

## 2023-06-05 PROBLEM — J84.9 INTERSTITIAL PULMONARY DISEASE, UNSPECIFIED: Status: RESOLVED | Noted: 2023-01-01 | Resolved: 2023-01-01

## 2023-06-05 PROBLEM — R79.89 ELEVATED TROPONIN: Status: RESOLVED | Noted: 2021-12-15 | Resolved: 2023-01-01

## 2023-06-08 PROBLEM — Z51.5 COMFORT MEASURES ONLY STATUS: Status: ACTIVE | Noted: 2023-01-01
